# Patient Record
Sex: FEMALE | Race: OTHER
[De-identification: names, ages, dates, MRNs, and addresses within clinical notes are randomized per-mention and may not be internally consistent; named-entity substitution may affect disease eponyms.]

---

## 2017-11-10 ENCOUNTER — HOSPITAL ENCOUNTER (INPATIENT)
Dept: HOSPITAL 72 - EMR | Age: 67
LOS: 5 days | Discharge: ANOTHER HOSPITAL | DRG: 288 | End: 2017-11-15
Payer: MEDICARE

## 2017-11-10 VITALS — SYSTOLIC BLOOD PRESSURE: 134 MMHG | DIASTOLIC BLOOD PRESSURE: 68 MMHG

## 2017-11-10 VITALS — DIASTOLIC BLOOD PRESSURE: 62 MMHG | SYSTOLIC BLOOD PRESSURE: 128 MMHG

## 2017-11-10 VITALS — DIASTOLIC BLOOD PRESSURE: 52 MMHG | SYSTOLIC BLOOD PRESSURE: 105 MMHG

## 2017-11-10 VITALS — BODY MASS INDEX: 23.9 KG/M2 | WEIGHT: 140 LBS | HEIGHT: 64 IN

## 2017-11-10 DIAGNOSIS — I11.0: ICD-10-CM

## 2017-11-10 DIAGNOSIS — G93.40: ICD-10-CM

## 2017-11-10 DIAGNOSIS — I35.1: ICD-10-CM

## 2017-11-10 DIAGNOSIS — I45.81: ICD-10-CM

## 2017-11-10 DIAGNOSIS — E11.9: ICD-10-CM

## 2017-11-10 DIAGNOSIS — I33.0: Primary | ICD-10-CM

## 2017-11-10 DIAGNOSIS — M48.00: ICD-10-CM

## 2017-11-10 DIAGNOSIS — I50.41: ICD-10-CM

## 2017-11-10 DIAGNOSIS — R21: ICD-10-CM

## 2017-11-10 DIAGNOSIS — R00.0: ICD-10-CM

## 2017-11-10 DIAGNOSIS — M17.0: ICD-10-CM

## 2017-11-10 DIAGNOSIS — J45.909: ICD-10-CM

## 2017-11-10 DIAGNOSIS — Z79.82: ICD-10-CM

## 2017-11-10 DIAGNOSIS — G89.29: ICD-10-CM

## 2017-11-10 DIAGNOSIS — M43.10: ICD-10-CM

## 2017-11-10 DIAGNOSIS — F31.9: ICD-10-CM

## 2017-11-10 DIAGNOSIS — D64.9: ICD-10-CM

## 2017-11-10 DIAGNOSIS — E78.00: ICD-10-CM

## 2017-11-10 LAB
ALBUMIN/GLOB SERPL: 0.6 {RATIO} (ref 1–2.7)
ALT SERPL-CCNC: 22 U/L (ref 12–78)
ANION GAP SERPL CALC-SCNC: 9 MMOL/L (ref 5–15)
APTT BLD: 29 SEC (ref 23–33)
AST SERPL-CCNC: 22 U/L (ref 15–37)
BASOPHILS NFR BLD AUTO: 1.6 % (ref 0–2)
CALCIUM SERPL-MCNC: 9.1 MG/DL (ref 8.5–10.1)
CHLORIDE SERPL-SCNC: 105 MMOL/L (ref 98–107)
CK MB SERPL-MCNC: 13.4 NG/ML (ref 0–3.6)
CO2 SERPL-SCNC: 24 MMOL/L (ref 21–32)
CREAT SERPL-MCNC: 0.9 MG/DL (ref 0.55–1.3)
EOSINOPHIL NFR BLD AUTO: 0.1 % (ref 0–3)
ERYTHROCYTE [DISTWIDTH] IN BLOOD BY AUTOMATED COUNT: 14.7 % (ref 11.6–14.8)
GFR SERPLBLD BASED ON 1.73 SQ M-ARVRAT: > 60 ML/MIN (ref 60–?)
GLOBULIN SER-MCNC: 4.9 G/DL
INR PPP: 1.1 (ref 0.9–1.1)
LIPASE SERPL-CCNC: 163 U/L (ref 73–393)
LYMPHOCYTES NFR BLD AUTO: 17.9 % (ref 20–45)
MCH RBC QN AUTO: 28.2 PG (ref 27–31)
MCHC RBC AUTO-ENTMCNC: 30.7 G/DL (ref 32–36)
MCV RBC AUTO: 92 FL (ref 80–99)
MONOCYTES NFR BLD AUTO: 4.7 % (ref 1–10)
NEUTROPHILS NFR BLD AUTO: 75.7 % (ref 45–75)
PLATELET # BLD: 175 K/UL (ref 150–450)
PMV BLD AUTO: 6.4 FL (ref 6.5–10.1)
POTASSIUM SERPL-SCNC: 3.9 MMOL/L (ref 3.5–5.1)
PROT SERPL-MCNC: 7.6 G/DL (ref 6.4–8.2)
PROTHROMBIN TIME: 11.3 SEC (ref 9.3–11.5)
RBC # BLD AUTO: 2.82 M/UL (ref 4.2–5.4)
SODIUM SERPL-SCNC: 138 MMOL/L (ref 136–145)
WBC # BLD AUTO: 3.8 K/UL (ref 4.8–10.8)

## 2017-11-10 PROCEDURE — 94664 DEMO&/EVAL PT USE INHALER: CPT

## 2017-11-10 PROCEDURE — 87081 CULTURE SCREEN ONLY: CPT

## 2017-11-10 PROCEDURE — 85651 RBC SED RATE NONAUTOMATED: CPT

## 2017-11-10 PROCEDURE — 93306 TTE W/DOPPLER COMPLETE: CPT

## 2017-11-10 PROCEDURE — 83036 HEMOGLOBIN GLYCOSYLATED A1C: CPT

## 2017-11-10 PROCEDURE — 83605 ASSAY OF LACTIC ACID: CPT

## 2017-11-10 PROCEDURE — 36569 INSJ PICC 5 YR+ W/O IMAGING: CPT

## 2017-11-10 PROCEDURE — 82746 ASSAY OF FOLIC ACID SERUM: CPT

## 2017-11-10 PROCEDURE — 76937 US GUIDE VASCULAR ACCESS: CPT

## 2017-11-10 PROCEDURE — 82962 GLUCOSE BLOOD TEST: CPT

## 2017-11-10 PROCEDURE — 83690 ASSAY OF LIPASE: CPT

## 2017-11-10 PROCEDURE — 83550 IRON BINDING TEST: CPT

## 2017-11-10 PROCEDURE — 80061 LIPID PANEL: CPT

## 2017-11-10 PROCEDURE — 82550 ASSAY OF CK (CPK): CPT

## 2017-11-10 PROCEDURE — 85007 BL SMEAR W/DIFF WBC COUNT: CPT

## 2017-11-10 PROCEDURE — 93005 ELECTROCARDIOGRAM TRACING: CPT

## 2017-11-10 PROCEDURE — 83880 ASSAY OF NATRIURETIC PEPTIDE: CPT

## 2017-11-10 PROCEDURE — 84484 ASSAY OF TROPONIN QUANT: CPT

## 2017-11-10 PROCEDURE — 83735 ASSAY OF MAGNESIUM: CPT

## 2017-11-10 PROCEDURE — 93312 ECHO TRANSESOPHAGEAL: CPT

## 2017-11-10 PROCEDURE — 83615 LACTATE (LD) (LDH) ENZYME: CPT

## 2017-11-10 PROCEDURE — 86140 C-REACTIVE PROTEIN: CPT

## 2017-11-10 PROCEDURE — 93882 EXTRACRANIAL UNI/LTD STUDY: CPT

## 2017-11-10 PROCEDURE — 87040 BLOOD CULTURE FOR BACTERIA: CPT

## 2017-11-10 PROCEDURE — 94150 VITAL CAPACITY TEST: CPT

## 2017-11-10 PROCEDURE — 85025 COMPLETE CBC W/AUTO DIFF WBC: CPT

## 2017-11-10 PROCEDURE — 94003 VENT MGMT INPAT SUBQ DAY: CPT

## 2017-11-10 PROCEDURE — 99285 EMERGENCY DEPT VISIT HI MDM: CPT

## 2017-11-10 PROCEDURE — 83540 ASSAY OF IRON: CPT

## 2017-11-10 PROCEDURE — 85060 BLOOD SMEAR INTERPRETATION: CPT

## 2017-11-10 PROCEDURE — 80048 BASIC METABOLIC PNL TOTAL CA: CPT

## 2017-11-10 PROCEDURE — 82607 VITAMIN B-12: CPT

## 2017-11-10 PROCEDURE — 71275 CT ANGIOGRAPHY CHEST: CPT

## 2017-11-10 PROCEDURE — 86901 BLOOD TYPING SEROLOGIC RH(D): CPT

## 2017-11-10 PROCEDURE — 86900 BLOOD TYPING SEROLOGIC ABO: CPT

## 2017-11-10 PROCEDURE — 80053 COMPREHEN METABOLIC PANEL: CPT

## 2017-11-10 PROCEDURE — 82553 CREATINE MB FRACTION: CPT

## 2017-11-10 PROCEDURE — 85044 MANUAL RETICULOCYTE COUNT: CPT

## 2017-11-10 PROCEDURE — 71010: CPT

## 2017-11-10 PROCEDURE — 80202 ASSAY OF VANCOMYCIN: CPT

## 2017-11-10 PROCEDURE — 82270 OCCULT BLOOD FECES: CPT

## 2017-11-10 PROCEDURE — 86850 RBC ANTIBODY SCREEN: CPT

## 2017-11-10 PROCEDURE — 85610 PROTHROMBIN TIME: CPT

## 2017-11-10 PROCEDURE — 85730 THROMBOPLASTIN TIME PARTIAL: CPT

## 2017-11-10 PROCEDURE — 80170 ASSAY OF GENTAMICIN: CPT

## 2017-11-10 PROCEDURE — 36415 COLL VENOUS BLD VENIPUNCTURE: CPT

## 2017-11-10 RX ADMIN — DIPHENHYDRAMINE HYDROCHLORIDE PRN MG: 50 INJECTION INTRAMUSCULAR; INTRAVENOUS at 22:28

## 2017-11-10 RX ADMIN — CEPHALEXIN SCH MG: 500 TABLET ORAL at 23:31

## 2017-11-10 RX ADMIN — ATORVASTATIN CALCIUM SCH MG: 20 TABLET, FILM COATED ORAL at 22:27

## 2017-11-10 NOTE — DIAGNOSTIC IMAGING REPORT
Indication: Dyspnea



Comparison:  None



A single view chest radiograph was obtained.



Findings:



Osteopenic. Heart is enlarged. There is a PICC line on the right with the tip in the

SVC. Lungs are essentially clear. Vascularity is mildly prominent.



Impression:



No acute disease

## 2017-11-10 NOTE — CARDIOLOGY PROGRESS NOTE
Assessment/Plan


Assessment/Plan


The patient is seen and examined, full consult note is dictated.





Objective





Last 24 Hour Vital Signs








  Date Time  Temp Pulse Resp B/P (MAP) Pulse Ox O2 Delivery O2 Flow Rate FiO2


 


11/10/17 14:52 98.8 102 20 112/50 97 Room Air  

















Intake and Output  


 


 11/10/17 11/11/17





 19:00 07:00


 


Intake Total 0 ml 


 


Balance 0 ml 


 


  


 


Intake Oral 0 ml 











Laboratory Tests








Test


  11/10/17


15:15


 


White Blood Count


  3.8 K/UL


(4.8-10.8)  L


 


Red Blood Count


  2.82 M/UL


(4.20-5.40)  L


 


Hemoglobin


  8.0 G/DL


(12.0-16.0)  L


 


Hematocrit


  25.9 %


(37.0-47.0)  L


 


Mean Corpuscular Volume 92 FL (80-99)  


 


Mean Corpuscular Hemoglobin


  28.2 PG


(27.0-31.0)


 


Mean Corpuscular Hemoglobin


Concent 30.7 G/DL


(32.0-36.0)  L


 


Red Cell Distribution Width


  14.7 %


(11.6-14.8)


 


Platelet Count


  175 K/UL


(150-450)


 


Mean Platelet Volume


  6.4 FL


(6.5-10.1)  L


 


Neutrophils (%) (Auto)


  75.7 %


(45.0-75.0)  H


 


Lymphocytes (%) (Auto)


  17.9 %


(20.0-45.0)  L


 


Monocytes (%) (Auto)


  4.7 %


(1.0-10.0)


 


Eosinophils (%) (Auto)


  0.1 %


(0.0-3.0)


 


Basophils (%) (Auto)


  1.6 %


(0.0-2.0)


 


Prothrombin Time


  11.3 SEC


(9.30-11.50)


 


Prothromb Time International


Ratio 1.1 (0.9-1.1)  


 


 


Activated Partial


Thromboplast Time 29 SEC (23-33)


 


 


Sodium Level


  138 MMOL/L


(136-145)


 


Potassium Level


  3.9 MMOL/L


(3.5-5.1)


 


Chloride Level


  105 MMOL/L


()


 


Carbon Dioxide Level


  24 MMOL/L


(21-32)


 


Anion Gap


  9 mmol/L


(5-15)


 


Blood Urea Nitrogen


  19 mg/dL


(7-18)  H


 


Creatinine


  0.9 MG/DL


(0.55-1.30)


 


Estimat Glomerular Filtration


Rate > 60 mL/min


(>60)


 


Glucose Level


  147 MG/DL


()  H


 


Lactic Acid Level


  1.30 mmol/L


(0.66-2.22)


 


Calcium Level


  9.1 MG/DL


(8.5-10.1)


 


Total Bilirubin


  0.4 MG/DL


(0.2-1.0)


 


Aspartate Amino Transf


(AST/SGOT) 22 U/L (15-37)


 


 


Alanine Aminotransferase


(ALT/SGPT) 22 U/L (12-78)


 


 


Alkaline Phosphatase


  92 U/L


()


 


Total Creatine Kinase


  20 U/L


()  L


 


Creatine Kinase MB


  13.4 NG/ML


(0.0-3.6)  H


 


Creatine Kinase MB Relative


Index 67.0  


 


 


Troponin I


  0.128 ng/mL


(0.000-0.056)


 


Total Protein


  7.6 G/DL


(6.4-8.2)


 


Albumin


  2.7 G/DL


(3.4-5.0)  L


 


Globulin 4.9 g/dL  


 


Albumin/Globulin Ratio


  0.6 (1.0-2.7)


L


 


Lipase


  163 U/L


()

















SAMSON LAWTON Nov 10, 2017 18:13

## 2017-11-10 NOTE — EMERGENCY ROOM REPORT
History of Present Illness


General


Chief Complaint:  Syncope


Source:  Patient





Present Illness


HPI


Patient is a 67-year-old female brought in by EMS after being sent in by PMD 

the patient was noted to have recent syncopal episode.  The patient had been 

undergoing IV antibiotic treatment for infective endocarditis.  Patient prior 

history of bipolar disorder.  Patient poorly had a syncopal episode today with 

unknown length of time.  The patient denies any current symptoms.  She denies 

prior syncopal episodes.  Patient stated that she had been having a rash to 

baby oil.  Patient stated that the episode occurred while she was having a 

massage


Allergies:  


Coded Allergies:  


     No Known Allergies (Unverified , 11/10/17)





Patient History


Past Medical History:  see triage record


Past Surgical History:  unable to obtain


Reviewed Nursing Documentation:  PMH: Agreed, PSxH: Agreed





Nursing Documentation-PM


Past Medical History:  No History, Except For


Hx Cardiac Problems:  Yes - Endocarditis, hyperlipidemia


Hx Hypertension:  Yes - Osteoarthritis, spondylolisthesis, spinal stenosis


Hx Asthma:  Yes


Hx Diabetes:  Yes


History Of Psychiatric Problem:  Yes - Bipolar disorder, major depression





Review of Systems


All Other Systems:  negative except mentioned in HPI





Physical Exam





Vital Signs








  Date Time  Temp Pulse Resp B/P (MAP) Pulse Ox O2 Delivery O2 Flow Rate FiO2


 


11/10/17 14:52 98.8 102 20 112/50 97 Room Air  








Sp02 EP Interpretation:  reviewed, normal


General Appearance:  normal inspection, well appearing, no apparent distress, 

alert, GCS 15


Head:  atraumatic


ENT:  normal ENT inspection, hearing grossly normal, normal voice


Neck:  normal inspection, full range of motion, supple, no bony tend


Respiratory:  normal inspection, lungs clear, normal breath sounds, no 

respiratory distress, no retraction, no wheezing


Cardiovascular #1:  regular rate, rhythm, no edema


Gastrointestinal:  normal inspection, normal bowel sounds, non tender, soft, no 

guarding, no hernia


Genitourinary:  no CVA tenderness


Musculoskeletal:  normal inspection, back normal, normal range of motion


Neurologic:  normal inspection, alert, oriented x3, responsive, CNs III-XII nml 

as tested, speech normal


Psychiatric:  normal inspection, judgement/insight normal, mood/affect normal


Skin:  normal inspection, other - generalized papular rash





Medical Decision Making


Diagnostic Impression:  


 Primary Impression:  


 Syncope


 Additional Impressions:  


 QT prolongation


 Anemia


 Endocarditis


ER Course


patient presented for syncope.Differential diagnosis for syncope included 

arrhythmia, dehydration, acute coronary syndrome, severe anemia, pulmonary 

embolus.Because of complexity of patient's case laboratory testing and imaging 

studies were ordered.





The patient noted have prior history of infective endocarditis.  Patient was on 

cardiac monitor.  The patient was started on IV fluids.  Dr. Moon was 

contacted for inpatient management due to patient's recent syncopal episode as 

well as history of endocarditis.  Patient noted be somewhat anemic.  The 

patient was typed and screened for blood.





Labs








Test


  11/10/17


15:15


 


White Blood Count


  3.8 K/UL


(4.8-10.8)


 


Red Blood Count


  2.82 M/UL


(4.20-5.40)


 


Hemoglobin


  8.0 G/DL


(12.0-16.0)


 


Hematocrit


  25.9 %


(37.0-47.0)


 


Mean Corpuscular Volume 92 FL (80-99) 


 


Mean Corpuscular Hemoglobin


  28.2 PG


(27.0-31.0)


 


Mean Corpuscular Hemoglobin


Concent 30.7 G/DL


(32.0-36.0)


 


Red Cell Distribution Width


  14.7 %


(11.6-14.8)


 


Platelet Count


  175 K/UL


(150-450)


 


Mean Platelet Volume


  6.4 FL


(6.5-10.1)


 


Neutrophils (%) (Auto)


  75.7 %


(45.0-75.0)


 


Lymphocytes (%) (Auto)


  17.9 %


(20.0-45.0)


 


Monocytes (%) (Auto)


  4.7 %


(1.0-10.0)


 


Eosinophils (%) (Auto)


  0.1 %


(0.0-3.0)


 


Basophils (%) (Auto)


  1.6 %


(0.0-2.0)


 


Prothrombin Time


  11.3 SEC


(9.30-11.50)


 


Prothromb Time International


Ratio 1.1 (0.9-1.1) 


 


 


Activated Partial


Thromboplast Time 29 SEC (23-33) 


 


 


Sodium Level


  138 MMOL/L


(136-145)


 


Potassium Level


  3.9 MMOL/L


(3.5-5.1)


 


Chloride Level


  105 MMOL/L


()


 


Carbon Dioxide Level


  24 MMOL/L


(21-32)


 


Anion Gap


  9 mmol/L


(5-15)


 


Blood Urea Nitrogen


  19 mg/dL


(7-18)


 


Creatinine


  0.9 MG/DL


(0.55-1.30)


 


Estimat Glomerular Filtration


Rate > 60 mL/min


(>60)


 


Glucose Level


  147 MG/DL


()


 


Lactic Acid Level


  1.30 mmol/L


(0.66-2.22)


 


Calcium Level


  9.1 MG/DL


(8.5-10.1)


 


Total Bilirubin


  0.4 MG/DL


(0.2-1.0)


 


Aspartate Amino Transf


(AST/SGOT) 22 U/L (15-37) 


 


 


Alanine Aminotransferase


(ALT/SGPT) 22 U/L (12-78) 


 


 


Alkaline Phosphatase


  92 U/L


()


 


Total Creatine Kinase


  20 U/L


()


 


Creatine Kinase MB


  13.4 NG/ML


(0.0-3.6)


 


Creatine Kinase MB Relative


Index 67.0 


 


 


Troponin I


  0.128 ng/mL


(0.000-0.056)


 


Total Protein


  7.6 G/DL


(6.4-8.2)


 


Albumin


  2.7 G/DL


(3.4-5.0)


 


Globulin 4.9 g/dL 


 


Albumin/Globulin Ratio 0.6 (1.0-2.7) 


 


Lipase


  163 U/L


()








EKG Diagnostic Results


Rate:  tachycardiac


Rhythm:  NSR - 104


ST Segments:  other - qt prolongation





Rhythm Strip Diag. Results


EP Interpretation:  yes


Rhythm:  no PVC's, no ectopy, other - sinus tachycardia 105





Last Vital Signs








  Date Time  Temp Pulse Resp B/P (MAP) Pulse Ox O2 Delivery O2 Flow Rate FiO2


 


11/10/17 14:52 98.8 102 20 112/50 97 Room Air  








Status:  unchanged


Disposition:  ADMITTED AS INPATIENT


Condition:  Serious











Prakash Grande Nov 10, 2017 15:02

## 2017-11-11 VITALS — DIASTOLIC BLOOD PRESSURE: 54 MMHG | SYSTOLIC BLOOD PRESSURE: 114 MMHG

## 2017-11-11 VITALS — DIASTOLIC BLOOD PRESSURE: 59 MMHG | SYSTOLIC BLOOD PRESSURE: 116 MMHG

## 2017-11-11 VITALS — DIASTOLIC BLOOD PRESSURE: 51 MMHG | SYSTOLIC BLOOD PRESSURE: 126 MMHG

## 2017-11-11 VITALS — SYSTOLIC BLOOD PRESSURE: 127 MMHG | DIASTOLIC BLOOD PRESSURE: 61 MMHG

## 2017-11-11 VITALS — SYSTOLIC BLOOD PRESSURE: 115 MMHG | DIASTOLIC BLOOD PRESSURE: 52 MMHG

## 2017-11-11 VITALS — DIASTOLIC BLOOD PRESSURE: 65 MMHG | SYSTOLIC BLOOD PRESSURE: 128 MMHG

## 2017-11-11 LAB
ALBUMIN/GLOB SERPL: 0.5 {RATIO} (ref 1–2.7)
ALT SERPL-CCNC: 21 U/L (ref 12–78)
ANION GAP SERPL CALC-SCNC: 10 MMOL/L (ref 5–15)
AST SERPL-CCNC: 19 U/L (ref 15–37)
BASOPHILS NFR BLD AUTO: 1.2 % (ref 0–2)
CALCIUM SERPL-MCNC: 9.4 MG/DL (ref 8.5–10.1)
CHLORIDE SERPL-SCNC: 107 MMOL/L (ref 98–107)
CHOLEST SERPL-MCNC: 89 MG/DL (ref ?–200)
CHOLEST/HDLC SERPL: 2.6 {RATIO} (ref 3.3–4.4)
CO2 SERPL-SCNC: 22 MMOL/L (ref 21–32)
CREAT SERPL-MCNC: 0.7 MG/DL (ref 0.55–1.3)
EOSINOPHIL NFR BLD AUTO: 0.1 % (ref 0–3)
ERYTHROCYTE [DISTWIDTH] IN BLOOD BY AUTOMATED COUNT: 14.8 % (ref 11.6–14.8)
GFR SERPLBLD BASED ON 1.73 SQ M-ARVRAT: > 60 ML/MIN (ref 60–?)
GLOBULIN SER-MCNC: 5 G/DL
HBA1C MFR BLD: 7.1 % (ref 4.3–6)
LYMPHOCYTES NFR BLD AUTO: 15.1 % (ref 20–45)
MCH RBC QN AUTO: 29.3 PG (ref 27–31)
MCHC RBC AUTO-ENTMCNC: 32.7 G/DL (ref 32–36)
MCV RBC AUTO: 90 FL (ref 80–99)
MONOCYTES NFR BLD AUTO: 5.5 % (ref 1–10)
NEUTROPHILS NFR BLD AUTO: 78.2 % (ref 45–75)
PLATELET # BLD: 216 K/UL (ref 150–450)
PMV BLD AUTO: 6.7 FL (ref 6.5–10.1)
POTASSIUM SERPL-SCNC: 3.8 MMOL/L (ref 3.5–5.1)
PROT SERPL-MCNC: 7.7 G/DL (ref 6.4–8.2)
RBC # BLD AUTO: 3.06 M/UL (ref 4.2–5.4)
SODIUM SERPL-SCNC: 139 MMOL/L (ref 136–145)
WBC # BLD AUTO: 4.1 K/UL (ref 4.8–10.8)

## 2017-11-11 RX ADMIN — CHLORHEXIDINE GLUCONATE SCH APPLIC: 213 SOLUTION TOPICAL at 21:12

## 2017-11-11 RX ADMIN — DIPHENHYDRAMINE HYDROCHLORIDE PRN MG: 50 INJECTION INTRAMUSCULAR; INTRAVENOUS at 09:43

## 2017-11-11 RX ADMIN — ASPIRIN SCH MG: 81 TABLET, DELAYED RELEASE ORAL at 08:20

## 2017-11-11 RX ADMIN — ATORVASTATIN CALCIUM SCH MG: 20 TABLET, FILM COATED ORAL at 21:12

## 2017-11-11 RX ADMIN — ENOXAPARIN SODIUM SCH MG: 40 INJECTION SUBCUTANEOUS at 13:12

## 2017-11-11 RX ADMIN — DIPHENHYDRAMINE HYDROCHLORIDE PRN MG: 50 INJECTION INTRAMUSCULAR; INTRAVENOUS at 16:33

## 2017-11-11 RX ADMIN — SODIUM CHLORIDE SCH MLS/HR: 0.9 INJECTION INTRAVENOUS at 16:34

## 2017-11-11 RX ADMIN — CEPHALEXIN SCH MG: 500 TABLET ORAL at 06:17

## 2017-11-11 RX ADMIN — DOCUSATE SODIUM SCH MG: 100 CAPSULE, LIQUID FILLED ORAL at 08:20

## 2017-11-11 RX ADMIN — CEPHALEXIN SCH MG: 500 TABLET ORAL at 12:00

## 2017-11-11 RX ADMIN — DOCUSATE SODIUM SCH MG: 100 CAPSULE, LIQUID FILLED ORAL at 18:05

## 2017-11-11 RX ADMIN — CLOBETASOL PROPIONATE SCH APPLIC: 0.5 OINTMENT TOPICAL at 18:05

## 2017-11-11 NOTE — CONSULTATION
History of Present Illness


General


Date patient seen:  Nov 11, 2017


Time patient seen:  10:34


Chief Complaint:  Syncope





Present Illness


HPI


66 y/o F with hx of HLD, OA, spinal stenosis, endocarditis, DM2, Bipolar 

disorder, spondylolisthesis, asthma  presents to ED on 11/10 after syncopal 

episode while patient receiving massage at rehab center. No LOC but did had LOC 

on arribale to hospital. In ED noted to be tachycardic to 102 despite 3 L of 

fluid. EKG with prolonged qtc 486.





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 

[facility] and was on treatment with IV Vanco, Doxy and Gentamycin; was 

discharged to [facility].





Denies CP, SOB, DE, dizziness/lightheadedness upon admission.





patient afebrile, no leukocytosis. echo pending 








REviewed records in the chart- patient presented to [facility] on 10/1 for 

low back pain and elevated trops. 2d echo showed possible AV vegatation. KITTY 

was going to be done but patient left AMA on 10/3. Eventually she got KITTY which 

showed echodense material on AV and also a mobile stucture pulling on LV- 

possible vegetations vs marantic non-infectious vegetations or fibroelastoma 

was considered. Started on IV Ceftriaxone and Vancomycin  and discharged to 

[facility]





Per physicians order at rehab: Doxycycline was planned up to 2/5/18 and 

Gentamycin 60mg IV bid until 11/19/17, IV Vancomycin until 12/12/17


Allergies:  


Uncoded Allergies:  


     Baby oil (Allergy, Severe, Itching, 11/10/17)





Medication History


Scheduled


Aspirin* (Aspir 81*), 81 MG ORAL DAILY, (Reported)


Atorvastatin Calcium* (Atorvastatin Calcium*), 40 MG ORAL BEDTIME, (Reported)


Cephalexin* (Cephalexin*), 500 MG ORAL EVERY 6 HOURS, (Reported)


Docusate Sodium* (Colace*), 100 MG ORAL TWICE A DAY, (Reported)


Doxycycline Hyclate* (Vibramycin*), 100 MG ORAL EVERY 12 HOURS, (Reported)


Enoxaparin* (Lovenox*), 40 MG SUBQ BID, (Reported)


Fluoxetine Hcl* (Fluoxetine Hcl*), 20 MG ORAL DAILY, (Reported)


Gentamicin In Nacl, Iso-Osm (Gentamicin 70 Mg/Ns 50 Ml Pb), 60 MG IV BID, (

Reported)


Metformin Hcl* (Metformin Hcl*), 850 MG ORAL BID, (Reported)


Olanzapine* (Zyprexa*), 5 MG ORAL BEDTIME, (Reported)


Vancomycin HCl/D5w (Vancomycin 1.5 Gram/250 ml-D5w), 1.5 GM IV BID, (Reported)


[Lispro], Unknown Dose SUBQ BEFORE MEALS, (Reported)





Scheduled PRN


Acetaminophen* (Acetaminophen 325MG Tablet*), 650 MG ORAL Q4H PRN for For Pain, 

(Reported)


Albuterol Sulfate* (Albuterol Sulfate Hhn*), 3 ML INH Q4H PRN for Shortness of 

Breath, (Reported)


Hydrocodone Bit/Acetaminophen 5-325* (Norco 5-325*), 1 TAB ORAL Q6H PRN for For 

Pain, (Reported)





Patient History


Healthcare decision maker


N


Resuscitation status


Full Code


Advanced Directive on File





Patient History Narrative


Pmx: as above





SH:  Denies any tobacco, alcohol, or illicit drug use.





Fhx non contributory





Review of Systems


ROS Narrative


as per HPI, otherwise negative





Physical Exam


Cardiovascular/Chest:  regularly irregular


Physical Exam Narrative


GENERAL:  The patient is a very pleasant 67-year-old female, in no apparent


respiratory distress, alert and oriented x4.


HEENT:  Atraumatic and normocephalic.  ENT, pupils are equal, round, and


reactive to light and accommodation.  Extraocular muscles are intact.


NECK:  JVP less than 5 cm.  No carotid bruits.  Carotid upstrokes 2+


bilaterally.


CARDIOVASCULAR:  Normal S1 and S2.  Regular rate and rhythm.  No murmurs,


gallops, or rubs.  PMI is at fourth intercostal space in the midclavicular


line.


LUNGS:  Clear to auscultation bilaterally.


ABDOMEN:  Soft, nontender, and nondistended.  No hepatosplenomegaly.


Positive bowel sounds.


EXTREMITIES:  No evidence of edema, clubbing, or cyanosis.





Last 24 Hour Vital Signs








  Date Time  Temp Pulse Resp B/P (MAP) Pulse Ox O2 Delivery O2 Flow Rate FiO2


 


11/11/17 08:00 97.8 115 21 127/61 97 Room Air  


 


11/11/17 04:00  119      


 


11/11/17 04:00 97.5 100 23 128/65 89 Room Air  


 


11/11/17 00:00 98.1 106 23 116/59 85 Room Air  


 


11/11/17 00:00  110      


 


11/10/17 20:00 97.9 100 23 105/52 94 Room Air  


 


11/10/17 20:00  108      


 


11/10/17 18:35 98.4 78 20 134/68 99 Room Air  


 


11/10/17 16:30 98.4 82 20 128/62 99 Room Air  


 


11/10/17 14:52 98.8 102 20 112/50 97 Room Air  











Laboratory Tests








Test


  11/10/17


15:15 11/10/17


18:45 11/11/17


05:15


 


White Blood Count


  3.8 K/UL


(4.8-10.8)  L 


  4.1 K/UL


(4.8-10.8)  L


 


Red Blood Count


  2.82 M/UL


(4.20-5.40)  L 


  3.06 M/UL


(4.20-5.40)  L


 


Hemoglobin


  8.0 G/DL


(12.0-16.0)  L 


  9.0 G/DL


(12.0-16.0)  L


 


Hematocrit


  25.9 %


(37.0-47.0)  L 


  27.5 %


(37.0-47.0)  L


 


Mean Corpuscular Volume 92 FL (80-99)    90 FL (80-99)  


 


Mean Corpuscular Hemoglobin


  28.2 PG


(27.0-31.0) 


  29.3 PG


(27.0-31.0)


 


Mean Corpuscular Hemoglobin


Concent 30.7 G/DL


(32.0-36.0)  L 


  32.7 G/DL


(32.0-36.0)


 


Red Cell Distribution Width


  14.7 %


(11.6-14.8) 


  14.8 %


(11.6-14.8)


 


Platelet Count


  175 K/UL


(150-450) 


  216 K/UL


(150-450)


 


Mean Platelet Volume


  6.4 FL


(6.5-10.1)  L 


  6.7 FL


(6.5-10.1)


 


Neutrophils (%) (Auto)


  75.7 %


(45.0-75.0)  H 


  78.2 %


(45.0-75.0)  H


 


Lymphocytes (%) (Auto)


  17.9 %


(20.0-45.0)  L 


  15.1 %


(20.0-45.0)  L


 


Monocytes (%) (Auto)


  4.7 %


(1.0-10.0) 


  5.5 %


(1.0-10.0)


 


Eosinophils (%) (Auto)


  0.1 %


(0.0-3.0) 


  0.1 %


(0.0-3.0)


 


Basophils (%) (Auto)


  1.6 %


(0.0-2.0) 


  1.2 %


(0.0-2.0)


 


Prothrombin Time


  11.3 SEC


(9.30-11.50) 


  


 


 


Prothromb Time International


Ratio 1.1 (0.9-1.1)  


  


  


 


 


Activated Partial


Thromboplast Time 29 SEC (23-33)


  


  


 


 


Sodium Level


  138 MMOL/L


(136-145) 


  139 MMOL/L


(136-145)


 


Potassium Level


  3.9 MMOL/L


(3.5-5.1) 


  3.8 MMOL/L


(3.5-5.1)


 


Chloride Level


  105 MMOL/L


() 


  107 MMOL/L


()


 


Carbon Dioxide Level


  24 MMOL/L


(21-32) 


  22 MMOL/L


(21-32)


 


Anion Gap


  9 mmol/L


(5-15) 


  10 mmol/L


(5-15)


 


Blood Urea Nitrogen


  19 mg/dL


(7-18)  H 


  15 mg/dL


(7-18)


 


Creatinine


  0.9 MG/DL


(0.55-1.30) 


  0.7 MG/DL


(0.55-1.30)


 


Estimat Glomerular Filtration


Rate > 60 mL/min


(>60) 


  > 60 mL/min


(>60)


 


Glucose Level


  147 MG/DL


()  H 


  160 MG/DL


()  H


 


Lactic Acid Level


  1.30 mmol/L


(0.66-2.22) 


  


 


 


Calcium Level


  9.1 MG/DL


(8.5-10.1) 


  9.4 MG/DL


(8.5-10.1)


 


Total Bilirubin


  0.4 MG/DL


(0.2-1.0) 


  0.4 MG/DL


(0.2-1.0)


 


Aspartate Amino Transf


(AST/SGOT) 22 U/L (15-37)


  


  19 U/L (15-37)


 


 


Alanine Aminotransferase


(ALT/SGPT) 22 U/L (12-78)


  


  21 U/L (12-78)


 


 


Alkaline Phosphatase


  92 U/L


() 


  95 U/L


()


 


Total Creatine Kinase


  20 U/L


()  L 


  


 


 


Creatine Kinase MB


  13.4 NG/ML


(0.0-3.6)  H 


  


 


 


Creatine Kinase MB Relative


Index 67.0  


  


  


 


 


Troponin I


  0.128 ng/mL


(0.000-0.056) 


  


 


 


Total Protein


  7.6 G/DL


(6.4-8.2) 


  7.7 G/DL


(6.4-8.2)


 


Albumin


  2.7 G/DL


(3.4-5.0)  L 


  2.7 G/DL


(3.4-5.0)  L


 


Globulin 4.9 g/dL    5.0 g/dL  


 


Albumin/Globulin Ratio


  0.6 (1.0-2.7)


L 


  0.5 (1.0-2.7)


L


 


Lipase


  163 U/L


() 


  


 


 


Magnesium Level


  


  1.3 MG/DL


(1.8-2.4)  L 


 


 


Hemoglobin A1c


  


  


  7.1 %


(4.3-6.0)  H


 


Triglycerides Level


  


  


  60 MG/DL


(0-200)


 


Cholesterol Level


  


  


  89 MG/DL (<


200)


 


LDL Cholesterol


  


  


  46 mg/dL


(<100)


 


HDL Cholesterol


  


  


  34 MG/DL


(40-60)  L


 


Cholesterol/HDL Ratio


  


  


  2.6 (3.3-4.4)


L








Height (Feet):  5


Height (Inches):  4.00


Weight (Pounds):  140


Medications





Current Medications








 Medications


  (Trade)  Dose


 Ordered  Sig/Lakshmi


 Route


 PRN Reason  Start Time


 Stop Time Status Last Admin


Dose Admin


 


 Acetaminophen


  (Tylenol)  650 mg  Q4H  PRN


 ORAL


 For Pain  11/10/17 21:00


 12/10/17 20:59   


 


 


 Acetaminophen/


 Hydrocodone Bitart


  (Norco 5/325)  1 tab  Q6H  PRN


 ORAL


 For Moderate to Severe Pain  11/10/17 21:00


 11/17/17 20:59   


 


 


 Albuterol Sulfate


  (Proventil)  2.5 mg  Q4H  PRN


 HHN


 Shortness of Breath  11/10/17 21:00


 11/15/17 20:59   


 


 


 Aspirin


  (Ecotrin)  81 mg  DAILY


 ORAL


   11/11/17 09:00


 12/11/17 08:59  11/11/17 08:20


 


 


 Atorvastatin


 Calcium


  (Lipitor)  40 mg  BEDTIME


 ORAL


   11/10/17 22:30


 12/10/17 22:29  11/10/17 22:27


 


 


 Cephalexin


  (Keflex)  500 mg  EVERY 6  HOURS


 ORAL


   11/11/17 00:00


 11/18/17 00:00  11/11/17 06:17


 


 


 Diphenhydramine


 HCl


  (Benadryl)  25 mg  Q6H  PRN


 IVP


 Itching  11/10/17 22:00


 12/10/17 21:59  11/10/17 22:28


 


 


 Docusate Sodium


  (Colace)  100 mg  TWICE A  DAY


 ORAL


   11/11/17 09:00


 12/11/17 08:59  11/11/17 08:20


 


 


 Doxycycline


 Monohydrate


  (Vibramycin)  100 mg  EVERY 12  HOURS


 ORAL


   11/10/17 22:30


 11/17/17 22:29  11/11/17 08:21


 


 


 Enoxaparin Sodium


  (Lovenox)  40 mg  BID


 SUBQ


   11/11/17 09:00


 12/11/17 08:59 UNV  


 


 


 Fluoxetine HCl


  (PROzac)  20 mg  DAILY


 ORAL


   11/11/17 09:00


 12/11/17 08:59  11/11/17 08:21


 


 


 Metformin HCl


  (Glucophage)  850 mg  BID


 ORAL


   11/11/17 09:00


 12/11/17 08:59   


 


 


 Olanzapine


  (ZyPREXA)  5 mg  BEDTIME


 ORAL


   11/10/17 22:30


 12/10/17 22:29  11/10/17 22:27


 











Assessment/Plan


Assessment/Plan


Abx:


Doxy (started PTA)


Keflex 11/11-





Assesment:


-Syncopal episode- r/o anatomic obstruction due to vegetations vs conduction 

abnormalities vs other





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





-Afebrile, no leukocytosis





HLD, OA, spinal stenosis, endocarditis, DM2, Bipolar disorder, spondylolisthesis

, asthma  





Plan:


-Will continue her already established treatment for endocarditis with  

Doxycycline (untill 2/5/18) ,Gentamycin 60mg IV bid (untill 11/19/11), IV 

Vancomycin (until 12/12/17)


  -monitor platelets, Cr


  -typically bartonella endocarditis consist of Doxy for 6 weeks and Genta for 

2 weeks; however patient has been managed by another ID physician as an 

outpatient and I dont currently have all her records at hand. Will continue abx 

tx as per her outpt ID physician





-d/c keflex


-f/u 2d echo; may need KITTY


-2 sets pf Bcx


-bartonella serologies


-ESR/CRP am (for baseine)


-f/u cx


-Monitor CBC/BMP, temperatures





Thank you for this consultation. Will continue to follow along with you.





Discussed with Jerrica Tanner M.D. Nov 11, 2017 09:33

## 2017-11-11 NOTE — HISTORY AND PHYSICAL REPORT
DATE OF ADMISSION:  11/10/2017



CHIEF COMPLAINT:  The patient is a 67-year-old white female, presents with

a chief complaint of "I passed out."



HISTORY OF PRESENT ILLNESS:  The patient is a resident of [facility].  According

to staff at [facility], the patient was undergoing

physical therapy.  The patient had a heating pad on her back.  The patient

began to feel dizzy.  The patient put her head down.  The patient states

she is aware of everything that was going on during this episode.  The

patient denies loss of consciousness.  EMS was called.  The patient was

transported to [facility] for syncopal episode.



It began in October.  The patient apparently was diagnosed with

endocarditis in [place].  The patient was started on intravenous

vancomycin and gentamicin.  The patient has a PICC line in place.  The

patient was transferred to [facility], because the

patient left the skilled nursing facility in [place].



According to staff at [facility], the patient

began to experience dizziness yesterday, 11/10/2017 during physical

therapy. The patient had a heating pad on her back.  The patient put her

head down on the table.  The patient states she did not lose

consciousness.  The patient states she was only dizzy.  EMS was called.

The patient was transported to [facility] for near syncopal

episode.



REVIEW OF SYSTEMS:  CONSTITUTIONAL:  The patient denies weight loss or

weight gain.  The patient denies fevers or chills.  HEENT:  The patient

denies ear or throat pain.  The patient denies headache.

CARDIOVASCULAR:  The patient denies palpitation or chest pain.    CHEST:

The patient denies wheeze or shortness of breath.  ABDOMEN:  The patient

denies nausea, vomiting, diarrhea, or constipation.  GENITOURINARY:  The

patient denies dysuria or increased frequency of urination.

NEUROMUSCULAR:  The patient complains of near syncopal episode as above.

The patient denies seizures or generalized weakness.    INTEGUMENT:  The

patient complains of a rash over the trunk.



PAST MEDICAL HISTORY:  Significant for:



1. Type 2 diabetes.

2. Hypertension.

3. Hypercholesterolemia.

4. Asthma.

5. Chronic low back pain.

6. Osteoarthritis of bilateral knees.



PAST SURGICAL HISTORY:  The patient denies.



CURRENT MEDICATIONS:  From [facility],



1. Aspirin 81 mg one tablet p.o. daily.

2. Atorvastatin 40 mg one tablet p.o. daily.

3. Doxycycline 100 mg one tablet p.o. twice daily.

4. Fluoxetine 20 mg one tablet p.o. daily.

5. Gentamicin 60 mg IV twice daily.

6. Zyprexa 5 mg one tablet p.o. at bedtime.

7. Vancomycin 1.5 g IV q.12 h.

8. Metformin 850 mg one tablet p.o. twice daily.

9. Keflex 500 mg one tablet p.o. four times daily.

10. Colace 100 mg one tablet p.o. twice daily.

11. Lovenox 40 mg one tablet subcutaneously daily.



ALLERGIES:  No known drug allergies.



SOCIAL HISTORY:  The patient is a .  The patient admits to tobacco use

of 1 cigarette daily.  The patient denies alcohol use.



PHYSICAL EXAMINATION:

VITAL SIGNS:  Temperature 98.4 respirations 20, pulse 78, and blood

pressure 134/68.

GENERAL:  The patient is a well-developed and well-nourished 

female, in no apparent distress.

HEENT:  Eyes, pupils are equal and responsive to light and accommodation.

Extraocular movements are intact.

NECK:  Supple without lymphadenopathy.

CHEST:  Lungs are clear to auscultation bilaterally without wheezes or

rales.

CARDIOVASCULAR:  Regular rhythm and rate.  S1 and S2 are normal without

murmurs, rubs, or gallops.

ABDOMEN:  Soft, nontender, nondistended with positive bowel sounds.  No

evidence of hepatosplenomegaly.  Currently, no rebound or guarding

noted.

EXTREMITIES:  Negative for clubbing, cyanosis, or edema.

NEUROLOGIC:  Cranial nerves II to XII are grossly intact without focal

deficits.  Motor strength is 5/5 bilaterally.  DTR reflexes are 2+

plantar.



LABORATORY STUDIES:  WBC is 3.8, hemoglobin 8.3, hematocrit 25.9, and

platelets 175,000.  Sodium 138, potassium 3.9, chloride 105, CO2 24, BUN

9, creatinine 0.9, and glucose 147.  Troponin elevated at 0.128.



ASSESSMENT:  This is a 67-year-old  female.



1. Endocarditis.

2. Syncopal episode.

3. Rash.

4. Diabetes type 2.

5. Hypertension.

6. Hypercholesterolemia.

7. Asthma.

8. Osteoarthritis of the bilateral knees.

9. Chronic low back pain.



TREATMENT:

1. Endocarditis. Infectious Disease consultation will be obtained with

Dr. Zhang.  Continue gentamicin and vancomycin as above.  We will follow

recommendations concerning Keflex and doxycycline per Infectious Disease.

The patient has a PICC line in place.  PICC line age is unknown.  Plan

will be replaced.

2. Syncopal episode.  A Cardiology consultation has been obtained with

Dr. Chivo Crandall.  Carotid Duplex and Dopplers are pending.  We will

follow recommendation of Dr. Crandall.

3. Diabetes type 2.  Continue metformin as above.  NovoLog sliding scale

has been instituted.

4. Hypertension.  The patient is currently normotensive.  Continue

clonidine 0.1 mg p.r.n.

5. Hypercholesterolemia.  Continue atorvastatin as above.

6. Asthma.  Continue albuterol metered dose inhaler two puffs p.o.

q.i.d. p.r.n.

7. Osteoarthritis of the knees.

8. Chronic low back pain.









  ______________________________________________

  Braden Townsend M.D.





DR:  LM/as

D:  11/11/2017 13:23

T:  11/11/2017 21:15

JOB#:  [id number]

CC:

## 2017-11-11 NOTE — HISTORY & PHYSICAL
History and Physical


History & Physicial


Dictated for Int Med-Dr oMon no. [id number].











PEBBLES HORTON Nov 11, 2017 13:24

## 2017-11-11 NOTE — CONSULTATION
DATE OF CONSULTATION:  11/10/2017



CARDIOLOGY CONSULTATION



CONSULTING PHYSICIAN:  Chivo Crandall M.D.



REFERRING PHYSICIAN:  Clayton Moon M.D.



REASON FOR CONSULTATION:  Management of syncope.



HISTORY OF PRESENT ILLNESS:  The patient is a very unfortunate 67-year-old

female, who was brought in by EMS after the primary care physician in the

[facility], decision made following an episode of

syncopal event that happened when the patient was receiving massage.  The

patient denies any syncopal event.  She claims that she did not lose

consciousness, however, according to the note, the patient did have loss

of consciousness on arrival to the hospital.  She was tachycardic at a

rate of 102 and despite three liters of IV fluid in the emergency

department, tachycardia persisted.  She was admitted to telemetry for

further evaluation and management.



She was recently diagnosed with infective endocarditis at the hospital

in [place] and discharged after one week of therapy with IV

antibiotics to [facility].  She currently denies any

chest pain or shortness of breath.  She states that she was walking in the

facility without having any dyspnea on exertion or feeling dizziness or

lightheadedness.



PAST MEDICAL HISTORY:  Infective endocarditis, hyperlipidemia,

osteoarthritis, spinal stenosis, spondylolisthesis, history of asthma,

history of diabetes mellitus, history of bipolar disorder/history of major

depression.



PAST SURGICAL HISTORY:  Right arm PICC placement.



MEDICATIONS:  List of medications include acetaminophen 650 mg q.4 h.

p.r.n. pain, albuterol inhaler 3 mL inhaler q.4 h. p.r.n. shortness of

breath, aspirin 81 mg daily, atorvastatin 40 mg daily at bedtime,

cephalexin 500 mg q.6 h. p.o., Colace 100 mg p.o. twice daily, vancomycin

100 mg oral q.12 h., Lovenox 40 mg subcutaneously twice daily, fluoxetine

20 mg p.o. daily, gentamicin 60 mg IV b.i.d., Norco 5/325 mg one tablet

q.6 h. p.r.n. pain, metformin 850 mg twice daily, Zyprexa 5 mg p.o. daily

at bedtime, and vancomycin 1.5 g IV b.i.d.



SOCIAL HISTORY:  Denies any tobacco, alcohol, or illicit drug use.



FAMILY HISTORY:  No premature coronary artery disease or arrhythmogenic

death in a first-degree relative.



REVIEW OF SYSTEMS:  HEENT:  Denies any headache, diplopia, or blurred

vision.    CONSTITUTIONAL:  Denies any fever, chills, night sweats, or

weight loss.  CARDIOVASCULAR:  Denies any chest pain, shortness breath,

PND, or orthopnea.  Denies any palpitation.  Positive for syncope.

PULMONARY:  Denies any cough, hemoptysis, or wheezing.  GASTROINTESTINAL:

Denies any nausea, vomiting, diarrhea, constipation, abdominal pain, or GI

bleed.    GENITOURINARY:  Denies any hematuria, dysuria, or incontinence.

NEUROLOGIC:  Denies any motor dysfunction, sensory deficit, or altered

speech.  Positive syncope.



PHYSICAL EXAMINATION:

VITAL SIGNS:  Blood pressure was 112/50, pulse 102, respirations 20,

temperature 98.8 degrees Fahrenheit, and O2 saturation 97% on room air.

GENERAL:  The patient is a very pleasant 67-year-old female, in no apparent

respiratory distress, alert and oriented x4.

HEENT:  Atraumatic and normocephalic.  ENT, pupils are equal, round, and

reactive to light and accommodation.  Extraocular muscles are intact.

NECK:  JVP less than 5 cm.  No carotid bruits.  Carotid upstrokes 2+

bilaterally.

CARDIOVASCULAR:  Normal S1 and S2.  Regular rate and rhythm.  No murmurs,

gallops, or rubs.  PMI is at fourth intercostal space in the midclavicular

line.

LUNGS:  Clear to auscultation bilaterally.

ABDOMEN:  Soft, nontender, and nondistended.  No hepatosplenomegaly.

Positive bowel sounds.

EXTREMITIES:  No evidence of edema, clubbing, or cyanosis.



DIAGNOSTIC DATA:  A 12-lead electrocardiogram shows sinus tachycardia, rate

of 105 with QT prolongation, no evidence of ST and T-wave abnormalities.



Chest x-ray showed no acute cardiopulmonary disease.



LABORATORY FINDINGS:  WBC is 3.8, hemoglobin of 8.0, hematocrit 25.9, and

platelet count 175,000.  Sodium is 138, potassium 3.9, chloride 105,

bicarbonate 24, BUN of 19, creatinine 0.9, glucose is 147, and calcium is

9.1.  Troponin I is 0.128.  INR is 1.1.



ASSESSMENT AND PLAN:  The patient is a very unfortunate 67-year-old lady,

who was seen in Cardiology consultation at the request of Dr. Moon.



1. Syncopal event.  The patient denies it, however, given the history of

infective endocarditis, we would like to give a benefit of doubt and do a

full workup of syncope including 2D echocardiography for assessment of

left ventricular systolic function as well as valvular function in

identifying the culprit valve that is causing the infective endocarditis.

Immunological phenomenon associated with infective endocarditis needs to

be evaluated.  The patient also requires carotid duplex artery,

orthostatic measurements, and possible Neurology evaluation.

2. Sinus tachycardia, unclear etiology.  We would like to obtain blood

cultures.  Infectious Disease consultation.  Hydration, orthostasis may

help ruling out dehydration.

3. History of infective endocarditis.  It is not clear, which valve was

involved.  We will obtain 2D echocardiography for assessment of this

condition.

4. Further therapeutic and diagnostic decision will be based on the

results of the above studies.



I would like to thank, Dr. Moon, for allowing me to participate in

the care of this patient.









  ______________________________________________

  Chivo Crandall M.D.





DR:  KM/as

D:  11/10/2017 18:37

T:  11/11/2017 03:35

JOB#:  [id number]

CC:

## 2017-11-12 VITALS — SYSTOLIC BLOOD PRESSURE: 106 MMHG | DIASTOLIC BLOOD PRESSURE: 49 MMHG

## 2017-11-12 VITALS — DIASTOLIC BLOOD PRESSURE: 50 MMHG | SYSTOLIC BLOOD PRESSURE: 110 MMHG

## 2017-11-12 VITALS — SYSTOLIC BLOOD PRESSURE: 101 MMHG | DIASTOLIC BLOOD PRESSURE: 42 MMHG

## 2017-11-12 VITALS — SYSTOLIC BLOOD PRESSURE: 123 MMHG | DIASTOLIC BLOOD PRESSURE: 61 MMHG

## 2017-11-12 VITALS — SYSTOLIC BLOOD PRESSURE: 121 MMHG | DIASTOLIC BLOOD PRESSURE: 54 MMHG

## 2017-11-12 VITALS — DIASTOLIC BLOOD PRESSURE: 54 MMHG | SYSTOLIC BLOOD PRESSURE: 123 MMHG

## 2017-11-12 LAB
%HYPO/RBC NFR BLD AUTO: (no result) %
ANION GAP SERPL CALC-SCNC: 8 MMOL/L (ref 5–15)
ANISOCYTOSIS BLD QL SMEAR: (no result)
BASOPHILS NFR BLD MANUAL: 0 % (ref 0–2)
CALCIUM SERPL-MCNC: 9.2 MG/DL (ref 8.5–10.1)
CHLORIDE SERPL-SCNC: 107 MMOL/L (ref 98–107)
CO2 SERPL-SCNC: 24 MMOL/L (ref 21–32)
CREAT SERPL-MCNC: 0.8 MG/DL (ref 0.55–1.3)
EOSINOPHIL NFR BLD MANUAL: 0 % (ref 0–3)
ERYTHROCYTE [DISTWIDTH] IN BLOOD BY AUTOMATED COUNT: 14.7 % (ref 11.6–14.8)
GFR SERPLBLD BASED ON 1.73 SQ M-ARVRAT: > 60 ML/MIN (ref 60–?)
MCH RBC QN AUTO: 29.4 PG (ref 27–31)
MCHC RBC AUTO-ENTMCNC: 32.6 G/DL (ref 32–36)
MCV RBC AUTO: 90 FL (ref 80–99)
NEUTS BAND NFR BLD MANUAL: 0 % (ref 0–8)
NEUTS BAND NFR BLD MANUAL: 69 % (ref 45–75)
PLAT MORPH BLD: NORMAL
PLATELET # BLD EST: ADEQUATE 10*3/UL
PLATELET # BLD: 206 K/UL (ref 150–450)
PMV BLD AUTO: 6.8 FL (ref 6.5–10.1)
POTASSIUM SERPL-SCNC: 3.8 MMOL/L (ref 3.5–5.1)
RBC # BLD AUTO: 3.03 M/UL (ref 4.2–5.4)
SODIUM SERPL-SCNC: 139 MMOL/L (ref 136–145)
TOTAL CELLS COUNTED BLD: 100
VARIANT LYMPHS NFR BLD MANUAL: 27 % (ref 20–45)
WBC # BLD AUTO: 2.8 K/UL (ref 4.8–10.8)

## 2017-11-12 RX ADMIN — Medication SCH MLS/HR: at 02:25

## 2017-11-12 RX ADMIN — ASPIRIN SCH MG: 81 TABLET, DELAYED RELEASE ORAL at 08:29

## 2017-11-12 RX ADMIN — DOCUSATE SODIUM SCH MG: 100 CAPSULE, LIQUID FILLED ORAL at 08:29

## 2017-11-12 RX ADMIN — SODIUM CHLORIDE SCH MLS/HR: 0.9 INJECTION INTRAVENOUS at 16:02

## 2017-11-12 RX ADMIN — Medication SCH MLS/HR: at 13:58

## 2017-11-12 RX ADMIN — CLOBETASOL PROPIONATE SCH APPLIC: 0.5 OINTMENT TOPICAL at 20:57

## 2017-11-12 RX ADMIN — ENOXAPARIN SODIUM SCH MG: 40 INJECTION SUBCUTANEOUS at 08:31

## 2017-11-12 RX ADMIN — SODIUM CHLORIDE SCH MLS/HR: 0.9 INJECTION INTRAVENOUS at 04:26

## 2017-11-12 RX ADMIN — DIPHENHYDRAMINE HYDROCHLORIDE PRN MG: 50 INJECTION INTRAMUSCULAR; INTRAVENOUS at 16:37

## 2017-11-12 RX ADMIN — DOCUSATE SODIUM SCH MG: 100 CAPSULE, LIQUID FILLED ORAL at 17:03

## 2017-11-12 RX ADMIN — CHLORHEXIDINE GLUCONATE SCH APPLIC: 213 SOLUTION TOPICAL at 20:19

## 2017-11-12 RX ADMIN — DIPHENHYDRAMINE HYDROCHLORIDE PRN MG: 50 INJECTION INTRAMUSCULAR; INTRAVENOUS at 08:32

## 2017-11-12 RX ADMIN — ATORVASTATIN CALCIUM SCH MG: 20 TABLET, FILM COATED ORAL at 20:53

## 2017-11-12 RX ADMIN — CLOBETASOL PROPIONATE SCH APPLIC: 0.5 OINTMENT TOPICAL at 08:30

## 2017-11-12 NOTE — DIAGNOSTIC IMAGING REPORT
Indication: Chest pain



Comparison: None



Technique: Contiguous helical CT images through the chest was performed the use 

of

intravenous contrast optimized for opacification of the pulmonary arterial tree.

Axial, coronal and sagittal reconstructions were reformatted. Using a separate 

3-D

workstation, 3-D imaging of the pulmonary arteries was also performed.



Findings:



There is adequate opacification of the pulmonary arterial tree. There is no evidence

of pulmonary embolus. Thoracic aorta is normal with no evidence of aneurysm 

or

dissection.



Heart size is mildly enlarged. Small pericardial effusion is seen.



Small to moderate sized bilateral pleural effusions are present. Lungs are clear. 

No

infiltrates. No pulmonary nodules. Some atelectasis and/or scarring is seen in the

lung bases. There is slightly increased density seen throughout the mediastinal fat

which is presumably related to fluid/pleural fluid however other etiologies such 

as

diffuse adenopathy cannot be excluded.



No acute osseous abnormalities. Degenerative changes are seen throughout the

thoracic spine.



Impression:



No evidence of pulmonary embolus.



Thoracic aorta is normal without aneurysm or dissection.



Small to moderate-sized bilateral pleural effusions.



Bibasilar atelectasis and/or scarring. Otherwise lungs are clear. No infiltrates 

or

nodules.



There is slightly increased density seen throughout the mediastinal fat which 

is

presumably related to fluid/pleural fluid however other etiologies such as 

diffuse

adenopathy cannot be excluded.

## 2017-11-12 NOTE — INFECTIOUS DISEASES PROG NOTE
Assessment/Plan


Assessment/Plan


A;


Aortic valve endocarditis


Syncope


Allergic drug reaction to Keflex


DM type 2


Anemia


P:


Will continue her already established treatment for endocarditis with  

Doxycycline (until 2/5/18) ,


Gentamicin 60mg IV bid (until 11/19/11), IV Vancomycin (until 12/12/17)





Subjective


ROS Limited/Unobtainable:  No


Constitutional:  Reports: no symptoms


Cardiovascular:  Reports: no symptoms


Gastrointestinal/Abdominal:  Reports: no symptoms


Genitourinary:  Reports: no symptoms


Skin:  Reports: rash


Allergies:  


Coded Allergies:  


     CEPHALEXIN (Verified  Allergy, Intermediate, diffuse maculopapular rash, 11 /11/17)


Uncoded Allergies:  


     Baby oil (Allergy, Severe, Itching, 11/10/17)





Objective


Vital Signs





Last 24 Hour Vital Signs








  Date Time  Temp Pulse Resp B/P (MAP) Pulse Ox O2 Delivery O2 Flow Rate FiO2


 


11/12/17 08:00  109      


 


11/12/17 08:00 98.2 107 16 121/54 94 Room Air  


 


11/12/17 07:55  110 20   Room Air  


 


11/12/17 04:20 98.2 106 20 101/42 94 Nasal Cannula 2.0 


 


11/12/17 04:00  105      


 


11/12/17 00:21 99.3 112 20 106/49 94 Room Air  


 


11/12/17 00:00  117      


 


11/11/17 20:15 98.8 113 20 114/54 94 Room Air  


 


11/11/17 20:00  115      


 


11/11/17 19:52  117 18   Room Air  


 


11/11/17 16:00  113      


 


11/11/17 16:00 99.5 112 20 115/52 96 Room Air  


 


11/11/17 12:00  113      


 


11/11/17 12:00 99.3 112 20 126/51 93 Room Air  








Height (Feet):  5


Height (Inches):  4.00


Weight (Pounds):  140


General Appearance:  no acute distress


HEENT:  mucous membranes moist


Respiratory/Chest:  lungs clear


Cardiovascular:  tachycardia


Abdomen:  soft, non tender


Extremities:  no edema


Skin:  rash, other - generalized maculopapuler rash


Neurologic/Psychiatric:  alert, oriented x 3, responsive





Microbiology








 Date/Time


Source Procedure


Growth Status


 


 


 11/10/17 15:25


Blood Blood Culture - Preliminary


NO GROWTH AFTER 24 HOURS Resulted


 


 11/10/17 15:15


Blood Blood Culture - Preliminary


NO GROWTH AFTER 24 HOURS Resulted











Laboratory Tests








Test


  11/12/17


06:10


 


White Blood Count


  2.8 K/UL


(4.8-10.8)  L


 


Red Blood Count


  3.03 M/UL


(4.20-5.40)  L


 


Hemoglobin


  8.9 G/DL


(12.0-16.0)  L


 


Hematocrit


  27.3 %


(37.0-47.0)  L


 


Mean Corpuscular Volume 90 FL (80-99)  


 


Mean Corpuscular Hemoglobin


  29.4 PG


(27.0-31.0)


 


Mean Corpuscular Hemoglobin


Concent 32.6 G/DL


(32.0-36.0)


 


Red Cell Distribution Width


  14.7 %


(11.6-14.8)


 


Platelet Count


  206 K/UL


(150-450)


 


Mean Platelet Volume


  6.8 FL


(6.5-10.1)


 


Neutrophils (%) (Auto)


  % (45.0-75.0)


 


 


Lymphocytes (%) (Auto)


  % (20.0-45.0)


 


 


Monocytes (%) (Auto)  % (1.0-10.0)  


 


Eosinophils (%) (Auto)  % (0.0-3.0)  


 


Basophils (%) (Auto)  % (0.0-2.0)  


 


Differential Total Cells


Counted 100  


 


 


Neutrophils % (Manual) 69 % (45-75)  


 


Lymphocytes % (Manual) 27 % (20-45)  


 


Monocytes % (Manual) 4 % (1-10)  


 


Eosinophils % (Manual) 0 % (0-3)  


 


Basophils % (Manual) 0 % (0-2)  


 


Band Neutrophils 0 % (0-8)  


 


Platelet Estimate Adequate  


 


Platelet Morphology Normal  


 


Hypochromasia 2+  


 


Anisocytosis 1+  


 


Erythrocyte Sedimentation Rate


  120 MM/HR


(0-30)  H


 


Sodium Level


  139 MMOL/L


(136-145)


 


Potassium Level


  3.8 MMOL/L


(3.5-5.1)


 


Chloride Level


  107 MMOL/L


()


 


Carbon Dioxide Level


  24 MMOL/L


(21-32)


 


Anion Gap


  8 mmol/L


(5-15)


 


Blood Urea Nitrogen


  15 mg/dL


(7-18)


 


Creatinine


  0.8 MG/DL


(0.55-1.30)


 


Estimat Glomerular Filtration


Rate > 60 mL/min


(>60)


 


Glucose Level


  144 MG/DL


()  H


 


Calcium Level


  9.2 MG/DL


(8.5-10.1)


 


C-Reactive Protein,


Quantitative 1.3 mg/dL


(0.00-0.90)  H











Current Medications








 Medications


  (Trade)  Dose


 Ordered  Sig/Lakshmi


 Route


 PRN Reason  Start Time


 Stop Time Status Last Admin


Dose Admin


 


 Acetaminophen


  (Tylenol)  650 mg  Q4H  PRN


 ORAL


 For Pain  11/10/17 21:00


 12/10/17 20:59   


 


 


 Acetaminophen/


 Hydrocodone Bitart


  (Norco 5/325)  1 tab  Q6H  PRN


 ORAL


 For Moderate to Severe Pain  11/10/17 21:00


 11/17/17 20:59   


 


 


 Albuterol Sulfate


  (Proventil)  2.5 mg  Q4H  PRN


 HHN


 Shortness of Breath  11/10/17 21:00


 11/15/17 20:59   


 


 


 Aspirin


  (Ecotrin)  81 mg  DAILY


 ORAL


   11/11/17 09:00


 12/11/17 08:59  11/12/17 08:29


 


 


 Atorvastatin


 Calcium


  (Lipitor)  40 mg  BEDTIME


 ORAL


   11/10/17 22:30


 12/10/17 22:29  11/11/17 21:12


 


 


 Chlorhexidine


 Gluconate


  (Althea-Hex 2%)  1 applic  DAILY@2000


 TOPIC


   11/11/17 20:00


 12/11/17 19:59  11/11/17 21:12


 


 


 Clobetasol


 Propionate


  (Temovate)  1 applic  TWICE A  DAY


 TOPIC


   11/11/17 18:00


 12/11/17 17:59  11/12/17 08:30


 


 


 Diphenhydramine


 HCl


  (Benadryl)  25 mg  Q6H  PRN


 IVP


 Itching  11/10/17 22:00


 12/10/17 21:59  11/12/17 08:32


 


 


 Docusate Sodium


  (Colace)  100 mg  TWICE A  DAY


 ORAL


   11/11/17 09:00


 12/11/17 08:59  11/12/17 08:29


 


 


 Doxycycline


 Monohydrate


  (Vibramycin)  100 mg  EVERY 12  HOURS


 ORAL


   11/10/17 22:30


 11/17/17 22:29  11/12/17 08:29


 


 


 Enoxaparin Sodium


  (Lovenox)  40 mg  DAILY


 SUBQ


   11/11/17 13:00


 12/11/17 12:59  11/12/17 08:31


 


 


 Fluoxetine HCl


  (PROzac)  20 mg  DAILY


 ORAL


   11/11/17 09:00


 12/11/17 08:59  11/12/17 08:29


 


 


 Gentamicin


 Protocol


  (Gentamicin


 pharmacy to dose)  1 ea  DAILY  PRN


 MISC


 Per rx protocol  11/11/17 13:00


 11/19/17 23:55   


 


 


 Gentamicin


 Sulfate 60 mg/


 Sodium Chloride  56.5 ml @ 


 110 mls/hr  Q12H


 IVPB


   11/11/17 16:00


 11/18/17 15:59  11/12/17 04:26


 


 


 Metformin HCl


  (Glucophage)  850 mg  BID


 ORAL


   11/11/17 09:00


 12/11/17 08:59  11/11/17 18:05


 


 


 Olanzapine


  (ZyPREXA)  5 mg  BEDTIME


 ORAL


   11/10/17 22:30


 12/10/17 22:29  11/11/17 21:12


 


 


 Vancomycin HCl


  (Vanco rx to


 dose)  1 ea  DAILY  PRN


 MISC


 Per rx protocol  11/11/17 13:00


 12/11/17 12:59   


 


 


 Vancomycin/Sodium


 Chloride  250 ml @ 


 166.667


 mls/hr  Q12H


 IVPB


   11/12/17 02:00


 11/17/17 01:59  11/12/17 02:25


 

















SANTI GAO Nov 12, 2017 12:03

## 2017-11-12 NOTE — GENERAL PROGRESS NOTE
Assessment/Plan


Status:  unchanged


Assessment/Plan


1. Acute encephlopathy


2. Sinus Tachy cardia


3. Pleural effusion


4. Pericardial effusion


5. Endocarditis.


6. Iatrogenic skin rashes


4. Diabetes type 2.


5. Hypertension.


6. Hypercholesterolemia.


7. Asthma.


8. Osteoarthritis of the bilateral knees.


9. Chronic low back pain.


10. GI-DVT prophylaxia





Plan:


case discussed with cardiology Dr Crandall. Dr sinclair consulted.





Subjective


ROS Limited/Unobtainable:  No


Constitutional:  Reports: malaise


HEENT:  Reports: no symptoms


Cardiovascular:  Reports: no symptoms


Allergies:  


Coded Allergies:  


     CEPHALEXIN (Verified  Allergy, Intermediate, diffuse maculopapular rash, 11 /11/17)


Uncoded Allergies:  


     Baby oil (Allergy, Severe, Itching, 11/10/17)





Objective





Last 24 Hour Vital Signs








  Date Time  Temp Pulse Resp B/P (MAP) Pulse Ox O2 Delivery O2 Flow Rate FiO2


 


11/12/17 08:00  109      


 


11/12/17 07:55  110 20   Room Air  


 


11/12/17 04:20 98.2 106 20 101/42 94 Nasal Cannula 2.0 


 


11/12/17 04:00  105      


 


11/12/17 00:21 99.3 112 20 106/49 94 Room Air  


 


11/12/17 00:00  117      


 


11/11/17 20:15 98.8 113 20 114/54 94 Room Air  


 


11/11/17 20:00  115      


 


11/11/17 19:52  117 18   Room Air  


 


11/11/17 16:00  113      


 


11/11/17 16:00 99.5 112 20 115/52 96 Room Air  


 


11/11/17 12:00  113      


 


11/11/17 12:00 99.3 112 20 126/51 93 Room Air  








Laboratory Tests


11/12/17 06:10: 


White Blood Count 2.8L, Red Blood Count 3.03L, Hemoglobin 8.9L, Hematocrit 27.3L

, Mean Corpuscular Volume 90, Mean Corpuscular Hemoglobin 29.4, Mean 

Corpuscular Hemoglobin Concent 32.6, Red Cell Distribution Width 14.7, Platelet 

Count 206, Mean Platelet Volume 6.8, Neutrophils (%) (Auto) , Lymphocytes (%) (

Auto) , Monocytes (%) (Auto) , Eosinophils (%) (Auto) , Basophils (%) (Auto) , 

Differential Total Cells Counted 100, Neutrophils % (Manual) 69, Lymphocytes % (

Manual) 27, Monocytes % (Manual) 4, Eosinophils % (Manual) 0, Basophils % (

Manual) 0, Band Neutrophils 0, Platelet Estimate Adequate, Platelet Morphology 

Normal, Hypochromasia 2+, Anisocytosis 1+, Erythrocyte Sedimentation Rate 120H, 

Sodium Level 139, Potassium Level 3.8, Chloride Level 107, Carbon Dioxide Level 

24, Anion Gap 8, Blood Urea Nitrogen 15, Creatinine 0.8, Estimat Glomerular 

Filtration Rate > 60, Glucose Level 144H, Calcium Level 9.2, C-Reactive Protein

, Quantitative 1.3H


Height (Feet):  5


Height (Inches):  4.00


Weight (Pounds):  140


General Appearance:  no apparent distress


EENT:  PERRL/EOMI


Neck:  supple


Cardiovascular:  tachycardia


Abdomen:  soft


Extremities:  non-tender


Neurologic:  CNs II-XII grossly normal











Clayton Moon MD Nov 12, 2017 10:13

## 2017-11-12 NOTE — CONSULTATION
History of Present Illness


General


Date patient seen:  Nov 12, 2017


Chief Complaint:  Syncope


Referring physician:  Dr. Moon


Reason for Consultation:  abnormal cxr





Present Illness


HPI


 67-year-old female with hx of endocarditis, brought in by EMS from a nursing 

home for a recent syncopal episode.  The patient had been undergoing IV 

antibiotic treatment for infective endocarditis.    The patient denies any 

current symptoms.  She denies prior syncopal episodes.  Patient stated that she 

had been having a rash to baby oil. Her initial cxr showed cardiomegaly and 

possible pleural effusion.  I was asked to evaluate her.


Allergies:  


Coded Allergies:  


     CEPHALEXIN (Verified  Allergy, Intermediate, diffuse maculopapular rash, 11 /11/17)


Uncoded Allergies:  


     Baby oil (Allergy, Severe, Itching, 11/10/17)





Medication History


Scheduled


Aspirin* (Aspir 81*), 81 MG ORAL DAILY, (Reported)


Atorvastatin Calcium* (Atorvastatin Calcium*), 40 MG ORAL BEDTIME, (Reported)


Cephalexin* (Cephalexin*), 500 MG ORAL EVERY 6 HOURS, (Reported)


Docusate Sodium* (Colace*), 100 MG ORAL TWICE A DAY, (Reported)


Doxycycline Hyclate* (Vibramycin*), 100 MG ORAL EVERY 12 HOURS, (Reported)


Enoxaparin* (Lovenox*), 40 MG SUBQ BID, (Reported)


Fluoxetine Hcl* (Fluoxetine Hcl*), 20 MG ORAL DAILY, (Reported)


Gentamicin In Nacl, Iso-Osm (Gentamicin 70 Mg/Ns 50 Ml Pb), 60 MG IV BID, (

Reported)


Metformin Hcl* (Metformin Hcl*), 850 MG ORAL BID, (Reported)


Olanzapine* (Zyprexa*), 5 MG ORAL BEDTIME, (Reported)


Vancomycin HCl/D5w (Vancomycin 1.5 Gram/250 ml-D5w), 1.5 GM IV BID, (Reported)


[Lispro], Unknown Dose SUBQ BEFORE MEALS, (Reported)





Scheduled PRN


Acetaminophen* (Acetaminophen 325MG Tablet*), 650 MG ORAL Q4H PRN for For Pain, 

(Reported)


Albuterol Sulfate* (Albuterol Sulfate Hhn*), 3 ML INH Q4H PRN for Shortness of 

Breath, (Reported)


Hydrocodone Bit/Acetaminophen 5-325* (Norco 5-325*), 1 TAB ORAL Q6H PRN for For 

Pain, (Reported)





Patient History


Healthcare decision maker


N


Resuscitation status


Full Code


Advanced Directive on File








Past Medical/Surgical History


Past Medical/Surgical History:  


(1) Endocarditis





Review of Systems


All Other Systems:  negative except mentioned in HPI





Physical Exam


General Appearance:  WD/WN


Lines, tubes and drains:  peripheral


HEENT:  normocephalic, atraumatic


Neck:  non-tender, normal alignment


Respiratory/Chest:  chest wall non-tender, normal breath sounds


Cardiovascular/Chest:  normal peripheral pulses, normal rate


Abdomen:  normal bowel sounds, non tender


Genitourinary/Rectal:  normal genital exam


Extremities:  normal range of motion, non-tender


Skin Exam:  normal pigmentation


Neurologic:  CNs II-XII grossly normal





Last 24 Hour Vital Signs








  Date Time  Temp Pulse Resp B/P (MAP) Pulse Ox O2 Delivery O2 Flow Rate FiO2


 


11/12/17 08:00  109      


 


11/12/17 07:55  110 20   Room Air  


 


11/12/17 04:20 98.2 106 20 101/42 94 Nasal Cannula 2.0 


 


11/12/17 04:00  105      


 


11/12/17 00:21 99.3 112 20 106/49 94 Room Air  


 


11/12/17 00:00  117      


 


11/11/17 20:15 98.8 113 20 114/54 94 Room Air  


 


11/11/17 20:00  115      


 


11/11/17 19:52  117 18   Room Air  


 


11/11/17 16:00  113      


 


11/11/17 16:00 99.5 112 20 115/52 96 Room Air  


 


11/11/17 12:00  113      


 


11/11/17 12:00 99.3 112 20 126/51 93 Room Air  











Laboratory Tests








Test


  11/12/17


06:10


 


White Blood Count


  2.8 K/UL


(4.8-10.8)  L


 


Red Blood Count


  3.03 M/UL


(4.20-5.40)  L


 


Hemoglobin


  8.9 G/DL


(12.0-16.0)  L


 


Hematocrit


  27.3 %


(37.0-47.0)  L


 


Mean Corpuscular Volume 90 FL (80-99)  


 


Mean Corpuscular Hemoglobin


  29.4 PG


(27.0-31.0)


 


Mean Corpuscular Hemoglobin


Concent 32.6 G/DL


(32.0-36.0)


 


Red Cell Distribution Width


  14.7 %


(11.6-14.8)


 


Platelet Count


  206 K/UL


(150-450)


 


Mean Platelet Volume


  6.8 FL


(6.5-10.1)


 


Neutrophils (%) (Auto)


  % (45.0-75.0)


 


 


Lymphocytes (%) (Auto)


  % (20.0-45.0)


 


 


Monocytes (%) (Auto)  % (1.0-10.0)  


 


Eosinophils (%) (Auto)  % (0.0-3.0)  


 


Basophils (%) (Auto)  % (0.0-2.0)  


 


Differential Total Cells


Counted 100  


 


 


Neutrophils % (Manual) 69 % (45-75)  


 


Lymphocytes % (Manual) 27 % (20-45)  


 


Monocytes % (Manual) 4 % (1-10)  


 


Eosinophils % (Manual) 0 % (0-3)  


 


Basophils % (Manual) 0 % (0-2)  


 


Band Neutrophils 0 % (0-8)  


 


Platelet Estimate Adequate  


 


Platelet Morphology Normal  


 


Hypochromasia 2+  


 


Anisocytosis 1+  


 


Erythrocyte Sedimentation Rate


  120 MM/HR


(0-30)  H


 


Sodium Level


  139 MMOL/L


(136-145)


 


Potassium Level


  3.8 MMOL/L


(3.5-5.1)


 


Chloride Level


  107 MMOL/L


()


 


Carbon Dioxide Level


  24 MMOL/L


(21-32)


 


Anion Gap


  8 mmol/L


(5-15)


 


Blood Urea Nitrogen


  15 mg/dL


(7-18)


 


Creatinine


  0.8 MG/DL


(0.55-1.30)


 


Estimat Glomerular Filtration


Rate > 60 mL/min


(>60)


 


Glucose Level


  144 MG/DL


()  H


 


Calcium Level


  9.2 MG/DL


(8.5-10.1)


 


C-Reactive Protein,


Quantitative 1.3 mg/dL


(0.00-0.90)  H








Height (Feet):  5


Height (Inches):  4.00


Weight (Pounds):  140


Medications





Current Medications








 Medications


  (Trade)  Dose


 Ordered  Sig/Lakshmi


 Route


 PRN Reason  Start Time


 Stop Time Status Last Admin


Dose Admin


 


 Acetaminophen


  (Tylenol)  650 mg  Q4H  PRN


 ORAL


 For Pain  11/10/17 21:00


 12/10/17 20:59   


 


 


 Acetaminophen/


 Hydrocodone Bitart


  (Norco 5/325)  1 tab  Q6H  PRN


 ORAL


 For Moderate to Severe Pain  11/10/17 21:00


 11/17/17 20:59   


 


 


 Albuterol Sulfate


  (Proventil)  2.5 mg  Q4H  PRN


 HHN


 Shortness of Breath  11/10/17 21:00


 11/15/17 20:59   


 


 


 Aspirin


  (Ecotrin)  81 mg  DAILY


 ORAL


   11/11/17 09:00


 12/11/17 08:59  11/12/17 08:29


 


 


 Atorvastatin


 Calcium


  (Lipitor)  40 mg  BEDTIME


 ORAL


   11/10/17 22:30


 12/10/17 22:29  11/11/17 21:12


 


 


 Chlorhexidine


 Gluconate


  (Althea-Hex 2%)  1 applic  DAILY@2000


 TOPIC


   11/11/17 20:00


 12/11/17 19:59  11/11/17 21:12


 


 


 Clobetasol


 Propionate


  (Temovate)  1 applic  TWICE A  DAY


 TOPIC


   11/11/17 18:00


 12/11/17 17:59  11/12/17 08:30


 


 


 Diphenhydramine


 HCl


  (Benadryl)  25 mg  Q6H  PRN


 IVP


 Itching  11/10/17 22:00


 12/10/17 21:59  11/12/17 08:32


 


 


 Docusate Sodium


  (Colace)  100 mg  TWICE A  DAY


 ORAL


   11/11/17 09:00


 12/11/17 08:59  11/12/17 08:29


 


 


 Doxycycline


 Monohydrate


  (Vibramycin)  100 mg  EVERY 12  HOURS


 ORAL


   11/10/17 22:30


 11/17/17 22:29  11/12/17 08:29


 


 


 Enoxaparin Sodium


  (Lovenox)  40 mg  DAILY


 SUBQ


   11/11/17 13:00


 12/11/17 12:59  11/12/17 08:31


 


 


 Fluoxetine HCl


  (PROzac)  20 mg  DAILY


 ORAL


   11/11/17 09:00


 12/11/17 08:59  11/12/17 08:29


 


 


 Gentamicin


 Protocol


  (Gentamicin


 pharmacy to dose)  1 ea  DAILY  PRN


 MISC


 Per rx protocol  11/11/17 13:00


 11/19/17 23:55   


 


 


 Gentamicin


 Sulfate 60 mg/


 Sodium Chloride  56.5 ml @ 


 110 mls/hr  Q12H


 IVPB


   11/11/17 16:00


 11/18/17 15:59  11/12/17 04:26


 


 


 Metformin HCl


  (Glucophage)  850 mg  BID


 ORAL


   11/11/17 09:00


 12/11/17 08:59  11/11/17 18:05


 


 


 Olanzapine


  (ZyPREXA)  5 mg  BEDTIME


 ORAL


   11/10/17 22:30


 12/10/17 22:29  11/11/17 21:12


 


 


 Vancomycin HCl


  (Vanco rx to


 dose)  1 ea  DAILY  PRN


 MISC


 Per rx protocol  11/11/17 13:00


 12/11/17 12:59   


 


 


 Vancomycin/Sodium


 Chloride  250 ml @ 


 166.667


 mls/hr  Q12H


 IVPB


   11/12/17 02:00


 11/17/17 01:59  11/12/17 02:25


 











Assessment/Plan


Problem List:  


(1) Cardiomegaly


ICD Codes:  I51.7 - Cardiomegaly


SNOMED:  9338309


(2) Syncope


ICD Codes:  R55 - Syncope and collapse


SNOMED:  778557492


(3) Anemia


ICD Codes:  D64.9 - Anemia, unspecified


SNOMED:  192919178


Assessment/Plan


CXR showing minimal effusion, I will repeat the it in the morning.


anemia w/u including OB, iron studies ordered


continue abx, F/u with ID











AP LINDO Nov 12, 2017 10:30

## 2017-11-12 NOTE — CARDIOLOGY PROGRESS NOTE
Assessment/Plan


Assessment/Plan


1. Sinus tachycardia, ? acute AR, if persistent would have to repeat KITTY.


2. Possible eccentric AR, cannot assess severity by echo.


3. Aortic valve endocarditis on IV antibiotics.


4. Bilateral pleural effusion


5. Possible syncopal event, normal EF, PE ruled out, ?neurocardiogenic ? 

hypovolemic.





Subjective


Subjective


Sinus tachycardia at 110.





Objective





Last 24 Hour Vital Signs








  Date Time  Temp Pulse Resp B/P (MAP) Pulse Ox O2 Delivery O2 Flow Rate FiO2


 


11/12/17 12:00 97.2 103 18 123/54 97 Room Air  


 


11/12/17 12:00  111      


 


11/12/17 08:00  109      


 


11/12/17 08:00 98.2 107 16 121/54 94 Room Air  


 


11/12/17 07:55  110 20   Room Air  


 


11/12/17 04:20 98.2 106 20 101/42 94 Nasal Cannula 2.0 


 


11/12/17 04:00  105      


 


11/12/17 00:21 99.3 112 20 106/49 94 Room Air  


 


11/12/17 00:00  117      


 


11/11/17 20:15 98.8 113 20 114/54 94 Room Air  


 


11/11/17 20:00  115      


 


11/11/17 19:52  117 18   Room Air  

















Intake and Output  


 


 11/12/17 11/13/17





 18:59 06:59


 


  


 


# Bowel Movements 1 








2D Echo:  LVEF 50%, Poss eccentric AR ?severity,RVSP 42 mmHg, Pseudo-normal LV 

physio





Laboratory Tests








Test


  11/12/17


06:10 11/12/17


15:30


 


White Blood Count


  2.8 K/UL


(4.8-10.8)  L 


 


 


Red Blood Count


  3.03 M/UL


(4.20-5.40)  L 


 


 


Hemoglobin


  8.9 G/DL


(12.0-16.0)  L 


 


 


Hematocrit


  27.3 %


(37.0-47.0)  L 


 


 


Mean Corpuscular Volume 90 FL (80-99)   


 


Mean Corpuscular Hemoglobin


  29.4 PG


(27.0-31.0) 


 


 


Mean Corpuscular Hemoglobin


Concent 32.6 G/DL


(32.0-36.0) 


 


 


Red Cell Distribution Width


  14.7 %


(11.6-14.8) 


 


 


Platelet Count


  206 K/UL


(150-450) 


 


 


Mean Platelet Volume


  6.8 FL


(6.5-10.1) 


 


 


Neutrophils (%) (Auto)


  % (45.0-75.0)


  


 


 


Lymphocytes (%) (Auto)


  % (20.0-45.0)


  


 


 


Monocytes (%) (Auto)  % (1.0-10.0)   


 


Eosinophils (%) (Auto)  % (0.0-3.0)   


 


Basophils (%) (Auto)  % (0.0-2.0)   


 


Differential Total Cells


Counted 100  


  


 


 


Neutrophils % (Manual) 69 % (45-75)   


 


Lymphocytes % (Manual) 27 % (20-45)   


 


Monocytes % (Manual) 4 % (1-10)   


 


Eosinophils % (Manual) 0 % (0-3)   


 


Basophils % (Manual) 0 % (0-2)   


 


Band Neutrophils 0 % (0-8)   


 


Platelet Estimate Adequate   


 


Platelet Morphology Normal   


 


Hypochromasia 2+   


 


Anisocytosis 1+   


 


Erythrocyte Sedimentation Rate


  120 MM/HR


(0-30)  H 


 


 


Sodium Level


  139 MMOL/L


(136-145) 


 


 


Potassium Level


  3.8 MMOL/L


(3.5-5.1) 


 


 


Chloride Level


  107 MMOL/L


() 


 


 


Carbon Dioxide Level


  24 MMOL/L


(21-32) 


 


 


Anion Gap


  8 mmol/L


(5-15) 


 


 


Blood Urea Nitrogen


  15 mg/dL


(7-18) 


 


 


Creatinine


  0.8 MG/DL


(0.55-1.30) 


 


 


Estimat Glomerular Filtration


Rate > 60 mL/min


(>60) 


 


 


Glucose Level


  144 MG/DL


()  H 


 


 


Calcium Level


  9.2 MG/DL


(8.5-10.1) 


 


 


C-Reactive Protein,


Quantitative 1.3 mg/dL


(0.00-0.90)  H 


 


 


Gentamicin Level Trough


  


  0.7 ug/mL


(0.3-2.0)











Microbiology








 Date/Time


Source Procedure


Growth Status


 


 


 11/10/17 15:25


Blood Blood Culture - Preliminary


NO GROWTH AFTER 24 HOURS Resulted


 


 11/10/17 15:15


Blood Blood Culture - Preliminary


NO GROWTH AFTER 24 HOURS Resulted








Objective


GENERAL:  The patient is a very pleasant 67-year-old female, in no apparent


respiratory distress, alert and oriented x4.


HEENT:  Atraumatic and normocephalic.  ENT, pupils are equal, round, and


reactive to light and accommodation.  Extraocular muscles are intact.


NECK:  JVP less than 5 cm.  No carotid bruits.  Carotid upstrokes 2+


bilaterally.


CARDIOVASCULAR:  Normal S1 and S2.  Regular rate and rhythm.  No murmurs,


gallops, or rubs.  PMI is at fourth intercostal space in the midclavicular


line.


LUNGS:  Clear to auscultation bilaterally.


ABDOMEN:  Soft, nontender, and nondistended.  No hepatosplenomegaly.


Positive bowel sounds.


EXTREMITIES:  No evidence of edema, clubbing, or cyanosis.











SAMSON LAWTON Nov 12, 2017 17:13

## 2017-11-13 VITALS — DIASTOLIC BLOOD PRESSURE: 46 MMHG | SYSTOLIC BLOOD PRESSURE: 109 MMHG

## 2017-11-13 VITALS — SYSTOLIC BLOOD PRESSURE: 110 MMHG | DIASTOLIC BLOOD PRESSURE: 50 MMHG

## 2017-11-13 VITALS — SYSTOLIC BLOOD PRESSURE: 129 MMHG | DIASTOLIC BLOOD PRESSURE: 60 MMHG

## 2017-11-13 VITALS — SYSTOLIC BLOOD PRESSURE: 127 MMHG | DIASTOLIC BLOOD PRESSURE: 63 MMHG

## 2017-11-13 VITALS — SYSTOLIC BLOOD PRESSURE: 115 MMHG | DIASTOLIC BLOOD PRESSURE: 55 MMHG

## 2017-11-13 VITALS — SYSTOLIC BLOOD PRESSURE: 111 MMHG | DIASTOLIC BLOOD PRESSURE: 48 MMHG

## 2017-11-13 LAB
%HYPO/RBC NFR BLD AUTO: (no result) %
ALBUMIN/GLOB SERPL: 0.6 {RATIO} (ref 1–2.7)
ALT SERPL-CCNC: 18 U/L (ref 12–78)
ANION GAP SERPL CALC-SCNC: 8 MMOL/L (ref 5–15)
ANISOCYTOSIS BLD QL SMEAR: (no result)
APTT BLD: 29 SEC (ref 23–33)
AST SERPL-CCNC: 23 U/L (ref 15–37)
BASOPHILS NFR BLD MANUAL: 0 % (ref 0–2)
CALCIUM SERPL-MCNC: 8.8 MG/DL (ref 8.5–10.1)
CHLORIDE SERPL-SCNC: 107 MMOL/L (ref 98–107)
CO2 SERPL-SCNC: 23 MMOL/L (ref 21–32)
CREAT SERPL-MCNC: 0.8 MG/DL (ref 0.55–1.3)
EOSINOPHIL NFR BLD MANUAL: 0 % (ref 0–3)
ERYTHROCYTE [DISTWIDTH] IN BLOOD BY AUTOMATED COUNT: 15 % (ref 11.6–14.8)
ERYTHROCYTE [SEDIMENTATION RATE] IN BLOOD: 119 MM/HR (ref 0–30)
FOLATE SERPL-MCNC: 11.1 NG/ML (ref 3.1–17.5)
GFR SERPLBLD BASED ON 1.73 SQ M-ARVRAT: > 60 ML/MIN (ref 60–?)
GLOBULIN SER-MCNC: 4.7 G/DL
INR PPP: 1.1 (ref 0.9–1.1)
IRON SERPL-MCNC: 17 UG/DL (ref 50–175)
LDH SERPL L TO P-CCNC: 179 U/L (ref 81–234)
MCH RBC QN AUTO: 28.9 PG (ref 27–31)
MCHC RBC AUTO-ENTMCNC: 32 G/DL (ref 32–36)
MCV RBC AUTO: 90 FL (ref 80–99)
NEUTS BAND NFR BLD MANUAL: 0 % (ref 0–8)
NEUTS BAND NFR BLD MANUAL: 80 % (ref 45–75)
PATH BLOOD SMEAR/OMC: (no result)
PLAT MORPH BLD: NORMAL
PLATELET # BLD EST: ADEQUATE 10*3/UL
PLATELET # BLD: 190 K/UL (ref 150–450)
PMV BLD AUTO: 6.7 FL (ref 6.5–10.1)
POTASSIUM SERPL-SCNC: 4 MMOL/L (ref 3.5–5.1)
PROT SERPL-MCNC: 7.4 G/DL (ref 6.4–8.2)
PROTHROMBIN TIME: 11.3 SEC (ref 9.3–11.5)
RBC # BLD AUTO: 2.95 M/UL (ref 4.2–5.4)
RETICS/RBC NFR: 0.9 % (ref 0–2)
SODIUM SERPL-SCNC: 138 MMOL/L (ref 136–145)
TIBC SERPL-MCNC: 287 UG/DL (ref 250–450)
TOTAL CELLS COUNTED BLD: 100
VARIANT LYMPHS NFR BLD MANUAL: 13 % (ref 20–45)
VIT B12 SERPL-MCNC: 257 PG/ML (ref 193–986)
WBC # BLD AUTO: 3 K/UL (ref 4.8–10.8)

## 2017-11-13 PROCEDURE — B518ZZA FLUOROSCOPY OF SUPERIOR VENA CAVA, GUIDANCE: ICD-10-PCS

## 2017-11-13 PROCEDURE — 02HV33Z INSERTION OF INFUSION DEVICE INTO SUPERIOR VENA CAVA, PERCUTANEOUS APPROACH: ICD-10-PCS

## 2017-11-13 RX ADMIN — ASPIRIN SCH MG: 81 TABLET, DELAYED RELEASE ORAL at 09:29

## 2017-11-13 RX ADMIN — Medication SCH MLS/HR: at 02:22

## 2017-11-13 RX ADMIN — DOCUSATE SODIUM SCH MG: 100 CAPSULE, LIQUID FILLED ORAL at 09:31

## 2017-11-13 RX ADMIN — Medication SCH MLS/HR: at 14:27

## 2017-11-13 RX ADMIN — CLOBETASOL PROPIONATE SCH APPLIC: 0.5 OINTMENT TOPICAL at 09:31

## 2017-11-13 RX ADMIN — CLOBETASOL PROPIONATE SCH APPLIC: 0.5 OINTMENT TOPICAL at 20:51

## 2017-11-13 RX ADMIN — ATORVASTATIN CALCIUM SCH MG: 20 TABLET, FILM COATED ORAL at 20:40

## 2017-11-13 RX ADMIN — Medication SCH MLS/HR: at 23:02

## 2017-11-13 RX ADMIN — SODIUM CHLORIDE SCH MLS/HR: 0.9 INJECTION INTRAVENOUS at 04:13

## 2017-11-13 RX ADMIN — CHLORHEXIDINE GLUCONATE SCH APPLIC: 213 SOLUTION TOPICAL at 20:50

## 2017-11-13 RX ADMIN — DOCUSATE SODIUM SCH MG: 100 CAPSULE, LIQUID FILLED ORAL at 18:01

## 2017-11-13 RX ADMIN — SODIUM CHLORIDE SCH MLS/HR: 0.9 INJECTION INTRAVENOUS at 16:47

## 2017-11-13 RX ADMIN — ENOXAPARIN SODIUM SCH MG: 40 INJECTION SUBCUTANEOUS at 09:30

## 2017-11-13 NOTE — DIAGNOSTIC IMAGING REPORT
Indication: DYSPNEA



Technique: One view of the chest



Comparison: 11/10/2017



Findings: There is increased interstitial edema. No focal airspace consolidation. 

No

definite effusions. Heart remains enlarged. Right arm PICC remains



Impression: Increasing bilateral interstitial edema, over 3 days

## 2017-11-13 NOTE — PULMONOLOGY PROGRESS NOTE
Assessment/Plan


Problems:  


(1) Cardiomegaly


(2) Syncope


(3) Anemia


(4) Endocarditis


Assessment/Plan


CT chest reviewed


continue current abx for endocarditis





KITTY scheduled





keep in teli





Subjective


ROS Limited/Unobtainable:  No


Constitutional:  Reports: no symptoms


Respiratory:  Reports: no symptoms, wheezing


Gastrointestinal/Abdominal:  Reports: no symptoms


Allergies:  


Coded Allergies:  


     CEPHALEXIN (Verified  Allergy, Intermediate, diffuse maculopapular rash, 11 /11/17)


Uncoded Allergies:  


     Baby oil (Allergy, Severe, Itching, 11/10/17)





Objective





Last 24 Hour Vital Signs








  Date Time  Temp Pulse Resp B/P (MAP) Pulse Ox O2 Delivery O2 Flow Rate FiO2


 


11/13/17 08:00 98.0 112 20 129/60 93 Room Air  


 


11/13/17 08:00  110      


 


11/13/17 07:00  109 20   Room Air  


 


11/13/17 04:00 98.4 112 25 109/46 95 Room Air  


 


11/13/17 03:41  104      


 


11/13/17 00:00 97.7 112 17 111/48 96 Room Air  


 


11/13/17 00:00  109      


 


11/12/17 20:17  115 20   Room Air  


 


11/12/17 20:00  118      


 


11/12/17 20:00 96.5 112 17 110/50 97 Room Air  


 


11/12/17 16:00  109      


 


11/12/17 16:00 97.5 113 16 123/61 94 Room Air  


 


11/12/17 12:00 97.2 103 18 123/54 97 Room Air  


 


11/12/17 12:00  111      

















Intake and Output  


 


 11/13/17 11/14/17





 19:00 07:00


 


  


 


# Bowel Movements 1 








General Appearance:  WD/WN, no acute distress


HEENT:  normocephalic


Respiratory/Chest:  chest wall non-tender, normal breath sounds


Cardiovascular:  normal peripheral pulses, normal rate


Abdomen:  normal bowel sounds, no organomegaly





Microbiology








 Date/Time


Source Procedure


Growth Status


 


 


 11/10/17 15:25


Blood Blood Culture - Preliminary


NO GROWTH AFTER 48 HOURS Resulted


 


 11/10/17 15:15


Blood Blood Culture - Preliminary


NO GROWTH AFTER 48 HOURS Resulted


 


 11/11/17 03:00


Nasal Nares MRSA Culture - Final


NO METHICILLIN RESISTANT STAPH AUREUS... Complete


 


 11/11/17 03:00


Rectum VRE Culture - Final


NO VANCOMYCIN RESISTANT ENTEROCOCCUS ... Complete








Laboratory Tests


11/12/17 15:30: Gentamicin Level Trough 0.7


11/12/17 17:00: Gentamicin Level Peak 2.1L


11/12/17 20:15: Stool Occult Blood Negative


11/13/17 06:00: 


White Blood Count 3.0L, Red Blood Count 2.95L, Hemoglobin 8.5L, Hematocrit 26.6L

, Mean Corpuscular Volume 90, Mean Corpuscular Hemoglobin 28.9, Mean 

Corpuscular Hemoglobin Concent 32.0, Red Cell Distribution Width 15.0H, 

Platelet Count 190, Mean Platelet Volume 6.7, Neutrophils (%) (Auto) , 

Lymphocytes (%) (Auto) , Monocytes (%) (Auto) , Eosinophils (%) (Auto) , 

Basophils (%) (Auto) , Differential Total Cells Counted 100, Neutrophils % (

Manual) 80H, Lymphocytes % (Manual) 13L, Monocytes % (Manual) 7, Eosinophils % (

Manual) 0, Basophils % (Manual) 0, Band Neutrophils 0, Platelet Estimate 

Adequate, Platelet Morphology Normal, Hypochromasia 1+, Anisocytosis 1+, 

Erythrocyte Sedimentation Rate 119H, Reticulocyte Count 0.9, Prothrombin Time 

11.3, Prothromb Time International Ratio 1.1, Activated Partial Thromboplast 

Time 29, Sodium Level 138, Potassium Level 4.0, Chloride Level 107, Carbon 

Dioxide Level 23, Anion Gap 8, Blood Urea Nitrogen 16, Creatinine 0.8, Estimat 

Glomerular Filtration Rate > 60, Glucose Level 149H, Calcium Level 8.8, Iron 

Level 17L, Total Iron Binding Capacity 287, Percent Iron Saturation 6L, 

Unsaturated Iron Binding 270, Total Bilirubin 0.2, Aspartate Amino Transf (AST/

SGOT) 23, Alanine Aminotransferase (ALT/SGPT) 18, Alkaline Phosphatase 98, 

Lactate Dehydrogenase 179, Troponin I 0.233H, Pro-B-Type Natriuretic Peptide 

2905H, Total Protein 7.4, Albumin 2.7L, Globulin 4.7, Albumin/Globulin Ratio 

0.6L, Vitamin B12 Level 257, Folate 11.1





Current Medications








 Medications


  (Trade)  Dose


 Ordered  Sig/Lakshmi


 Route


 PRN Reason  Start Time


 Stop Time Status Last Admin


Dose Admin


 


 Acetaminophen


  (Tylenol)  650 mg  Q4H  PRN


 ORAL


 For Pain  11/10/17 21:00


 12/10/17 20:59   


 


 


 Acetaminophen/


 Hydrocodone Bitart


  (Norco 5/325)  1 tab  Q6H  PRN


 ORAL


 For Moderate to Severe Pain  11/10/17 21:00


 11/17/17 20:59   


 


 


 Albuterol Sulfate


  (Proventil)  2.5 mg  Q4H  PRN


 HHN


 Shortness of Breath  11/10/17 21:00


 11/15/17 20:59   


 


 


 Aspirin


  (Ecotrin)  81 mg  DAILY


 ORAL


   11/11/17 09:00


 12/11/17 08:59  11/13/17 09:29


 


 


 Atorvastatin


 Calcium


  (Lipitor)  40 mg  BEDTIME


 ORAL


   11/10/17 22:30


 12/10/17 22:29  11/12/17 20:53


 


 


 Chlorhexidine


 Gluconate


  (Althea-Hex 2%)  1 applic  DAILY@2000


 TOPIC


   11/11/17 20:00


 12/11/17 19:59  11/12/17 20:19


 


 


 Clobetasol


 Propionate


  (Temovate)  1 applic  Q12HR


 TOPIC


   11/12/17 21:00


 12/12/17 20:59  11/13/17 09:31


 


 


 Diphenhydramine


 HCl


  (Benadryl)  25 mg  Q6H  PRN


 IVP


 Itching  11/10/17 22:00


 12/10/17 21:59  11/12/17 16:37


 


 


 Docusate Sodium


  (Colace)  100 mg  TWICE A  DAY


 ORAL


   11/11/17 09:00


 12/11/17 08:59  11/13/17 09:31


 


 


 Doxycycline


 Monohydrate


  (Vibramycin)  100 mg  EVERY 12  HOURS


 ORAL


   11/10/17 22:30


 11/17/17 22:29  11/13/17 09:30


 


 


 Enoxaparin Sodium


  (Lovenox)  40 mg  DAILY


 SUBQ


   11/11/17 13:00


 12/11/17 12:59  11/13/17 09:30


 


 


 Fluoxetine HCl


  (PROzac)  20 mg  DAILY


 ORAL


   11/11/17 09:00


 12/11/17 08:59  11/13/17 09:31


 


 


 Gentamicin


 Protocol


  (Gentamicin


 pharmacy to dose)  1 ea  DAILY  PRN


 MISC


 Per rx protocol  11/11/17 13:00


 11/19/17 23:55   


 


 


 Gentamicin


 Sulfate 60 mg/


 Sodium Chloride  56.5 ml @ 


 110 mls/hr  Q12H


 IVPB


   11/11/17 16:00


 11/18/17 15:59  11/13/17 04:13


 


 


 Metformin HCl


  (Glucophage)  850 mg  BID


 ORAL


   11/11/17 09:00


 12/11/17 08:59  11/11/17 18:05


 


 


 Olanzapine


  (ZyPREXA)  5 mg  BEDTIME


 ORAL


   11/10/17 22:30


 12/10/17 22:29  11/12/17 20:53


 


 


 Vancomycin HCl


  (Vanco rx to


 dose)  1 ea  DAILY  PRN


 MISC


 Per rx protocol  11/11/17 13:00


 12/11/17 12:59   


 


 


 Vancomycin/Sodium


 Chloride  250 ml @ 


 166.667


 mls/hr  Q12H


 IVPB


   11/12/17 02:00


 11/17/17 01:59  11/13/17 02:22


 

















AP LINDO Nov 13, 2017 11:38

## 2017-11-13 NOTE — CARDIOLOGY PROGRESS NOTE
Assessment/Plan


Assessment/Plan


1. Sinus tachycardia, probably heart failure due to valvular CM, ? acute AR,  

KITTY for Wednesday at 7:30 am.


2. Possible eccentric AR, cannot assess severity by TTE, will delineate more 

with KITTY.


3. Aortic valve endocarditis on IV antibiotics.


4. Bilateral pleural effusion


5. Possible syncopal event, normal EF, PE ruled out.





Subjective


Subjective


Remains to be holding sinus tachycardia at ~110.


Denies SOB!





Objective





Last 24 Hour Vital Signs








  Date Time  Temp Pulse Resp B/P (MAP) Pulse Ox O2 Delivery O2 Flow Rate FiO2


 


11/13/17 20:35 97.5 109 18 115/55 98 Room Air  


 


11/13/17 19:30  110 20   Room Air  21


 


11/13/17 16:00 98.6 109 21 110/50 93 Nasal Cannula 2.0 


 


11/13/17 16:00  109      


 


11/13/17 12:00 97.9 108 20 127/63 93 Room Air  


 


11/13/17 12:00  114      


 


11/13/17 08:00 98.0 112 20 129/60 93 Room Air  


 


11/13/17 08:00  110      


 


11/13/17 07:00  109 20   Room Air  


 


11/13/17 04:00 98.4 112 25 109/46 95 Room Air  


 


11/13/17 03:41  104      


 


11/13/17 00:00 97.7 112 17 111/48 96 Room Air  


 


11/13/17 00:00  109      

















Intake and Output  


 


 11/13/17 11/14/17





 19:00 07:00


 


Intake Total 869.834 ml 


 


Balance 869.834 ml 


 


  


 


Intake Oral 480 ml 


 


IV Total 389.834 ml 


 


# Bowel Movements 1 











Laboratory Tests








Test


  11/13/17


06:00 11/13/17


13:05


 


White Blood Count


  3.0 K/UL


(4.8-10.8)  L 


 


 


Red Blood Count


  2.95 M/UL


(4.20-5.40)  L 


 


 


Hemoglobin


  8.5 G/DL


(12.0-16.0)  L 


 


 


Hematocrit


  26.6 %


(37.0-47.0)  L 


 


 


Mean Corpuscular Volume 90 FL (80-99)   


 


Mean Corpuscular Hemoglobin


  28.9 PG


(27.0-31.0) 


 


 


Mean Corpuscular Hemoglobin


Concent 32.0 G/DL


(32.0-36.0) 


 


 


Red Cell Distribution Width


  15.0 %


(11.6-14.8)  H 


 


 


Platelet Count


  190 K/UL


(150-450) 


 


 


Mean Platelet Volume


  6.7 FL


(6.5-10.1) 


 


 


Neutrophils (%) (Auto)


  % (45.0-75.0)


  


 


 


Lymphocytes (%) (Auto)


  % (20.0-45.0)


  


 


 


Monocytes (%) (Auto)  % (1.0-10.0)   


 


Eosinophils (%) (Auto)  % (0.0-3.0)   


 


Basophils (%) (Auto)  % (0.0-2.0)   


 


Differential Total Cells


Counted 100  


  


 


 


Neutrophils % (Manual) 80 % (45-75)  H 


 


Lymphocytes % (Manual) 13 % (20-45)  L 


 


Monocytes % (Manual) 7 % (1-10)   


 


Eosinophils % (Manual) 0 % (0-3)   


 


Basophils % (Manual) 0 % (0-2)   


 


Band Neutrophils 0 % (0-8)   


 


Platelet Estimate Adequate   


 


Platelet Morphology Normal   


 


Hypochromasia 1+   


 


Anisocytosis 1+   


 


Erythrocyte Sedimentation Rate


  119 MM/HR


(0-30)  H 


 


 


Reticulocyte Count


  0.9 %


(0.0-2.0) 


 


 


Prothrombin Time


  11.3 SEC


(9.30-11.50) 


 


 


Prothromb Time International


Ratio 1.1 (0.9-1.1)  


  


 


 


Activated Partial


Thromboplast Time 29 SEC (23-33)


  


 


 


Sodium Level


  138 MMOL/L


(136-145) 


 


 


Potassium Level


  4.0 MMOL/L


(3.5-5.1) 


 


 


Chloride Level


  107 MMOL/L


() 


 


 


Carbon Dioxide Level


  23 MMOL/L


(21-32) 


 


 


Anion Gap


  8 mmol/L


(5-15) 


 


 


Blood Urea Nitrogen


  16 mg/dL


(7-18) 


 


 


Creatinine


  0.8 MG/DL


(0.55-1.30) 


 


 


Estimat Glomerular Filtration


Rate > 60 mL/min


(>60) 


 


 


Glucose Level


  149 MG/DL


()  H 


 


 


Calcium Level


  8.8 MG/DL


(8.5-10.1) 


 


 


Iron Level


  17 ug/dL


()  L 


 


 


Total Iron Binding Capacity


  287 ug/dL


(250-450) 


 


 


Percent Iron Saturation 6 % (15-50)  L 


 


Unsaturated Iron Binding


  270 ug/dL


(112-346) 


 


 


Total Bilirubin


  0.2 MG/DL


(0.2-1.0) 


 


 


Aspartate Amino Transf


(AST/SGOT) 23 U/L (15-37)


  


 


 


Alanine Aminotransferase


(ALT/SGPT) 18 U/L (12-78)


  


 


 


Alkaline Phosphatase


  98 U/L


() 


 


 


Lactate Dehydrogenase


  179 U/L


() 


 


 


Troponin I


  0.233 ng/mL


(0.000-0.056) 


 


 


Pro-B-Type Natriuretic Peptide


  2905 pg/mL


(0-125)  H 


 


 


Total Protein


  7.4 G/DL


(6.4-8.2) 


 


 


Albumin


  2.7 G/DL


(3.4-5.0)  L 


 


 


Globulin 4.7 g/dL   


 


Albumin/Globulin Ratio


  0.6 (1.0-2.7)


L 


 


 


Vitamin B12 Level


  257 PG/ML


(193-986) 


 


 


Folate


  11.1 NG/ML


(3.1-17.5) 


 


 


Vancomycin Level Trough


  


  9.2 ug/mL


(5.0-12.0)











Microbiology








 Date/Time


Source Procedure


Growth Status


 


 


 11/11/17 03:00


Nasal Nares MRSA Culture - Final


NO METHICILLIN RESISTANT STAPH AUREUS... Complete


 


 11/11/17 03:00


Rectum VRE Culture - Final


NO VANCOMYCIN RESISTANT ENTEROCOCCUS ... Complete








Objective


GENERAL:  The patient is a very pleasant 67-year-old female, in no apparent


respiratory distress, alert and oriented x4.


HEENT:  Atraumatic and normocephalic.  ENT, pupils are equal, round, and


reactive to light and accommodation.  Extraocular muscles are intact.


NECK:  JVP less than 5 cm.  No carotid bruits.  Carotid upstrokes 2+


bilaterally.


CARDIOVASCULAR:  Normal S1 and S2.  Regular rate and rhythm. Tachycardic. 

Summation gallops


with 2/6 MSM at LSB. PMI is at fourth intercostal space in the midclavicular


line.


LUNGS:  Clear to auscultation bilaterally.


ABDOMEN:  Soft, nontender, and nondistended.  No hepatosplenomegaly.


Positive bowel sounds.


EXTREMITIES:  No evidence of edema, clubbing, or cyanosis.











SAMSON LAWTON Nov 13, 2017 23:15

## 2017-11-13 NOTE — GENERAL PROGRESS NOTE
Assessment/Plan


Status:  stable


Assessment/Plan


1. Acute encephlopathy


2. Sinus Tachy cardia


3. Pleural effusion


4. Pericardial effusion


5. Endocarditis.


6. Iatrogenic skin rashes


4. Diabetes type 2.


5. Hypertension.


6. Hypercholesterolemia.


7. Asthma.


8. Osteoarthritis of the bilateral knees.


9. Chronic low back pain.


10. GI-DVT prophylaxia





Plan:


will proceed with KITTY


nohelia-Valvular complications merits additional w/u





Subjective


ROS Limited/Unobtainable:  No


Constitutional:  Reports: no symptoms


HEENT:  Reports: no symptoms


Cardiovascular:  Reports: no symptoms


Respiratory:  Reports: no symptoms


Allergies:  


Coded Allergies:  


     CEPHALEXIN (Verified  Allergy, Intermediate, diffuse maculopapular rash, 11 /11/17)


Uncoded Allergies:  


     Baby oil (Allergy, Severe, Itching, 11/10/17)





Objective





Last 24 Hour Vital Signs








  Date Time  Temp Pulse Resp B/P (MAP) Pulse Ox O2 Delivery O2 Flow Rate FiO2


 


11/13/17 08:00 98.0 112 20 129/60 93 Room Air  


 


11/13/17 08:00  110      


 


11/13/17 07:00  109 20   Room Air  


 


11/13/17 04:00 98.4 112 25 109/46 95 Room Air  


 


11/13/17 03:41  104      


 


11/13/17 00:00 97.7 112 17 111/48 96 Room Air  


 


11/13/17 00:00  109      


 


11/12/17 20:17  115 20   Room Air  


 


11/12/17 20:00  118      


 


11/12/17 20:00 96.5 112 17 110/50 97 Room Air  


 


11/12/17 16:00  109      


 


11/12/17 16:00 97.5 113 16 123/61 94 Room Air  

















Intake and Output  


 


 11/13/17 11/14/17





 19:00 07:00


 


  


 


# Bowel Movements 1 








Laboratory Tests


11/12/17 15:30: Gentamicin Level Trough 0.7


11/12/17 17:00: Gentamicin Level Peak 2.1L


11/12/17 20:15: Stool Occult Blood Negative


11/13/17 06:00: 


White Blood Count 3.0L, Red Blood Count 2.95L, Hemoglobin 8.5L, Hematocrit 26.6L

, Mean Corpuscular Volume 90, Mean Corpuscular Hemoglobin 28.9, Mean 

Corpuscular Hemoglobin Concent 32.0, Red Cell Distribution Width 15.0H, 

Platelet Count 190, Mean Platelet Volume 6.7, Neutrophils (%) (Auto) , 

Lymphocytes (%) (Auto) , Monocytes (%) (Auto) , Eosinophils (%) (Auto) , 

Basophils (%) (Auto) , Differential Total Cells Counted 100, Neutrophils % (

Manual) 80H, Lymphocytes % (Manual) 13L, Monocytes % (Manual) 7, Eosinophils % (

Manual) 0, Basophils % (Manual) 0, Band Neutrophils 0, Platelet Estimate 

Adequate, Platelet Morphology Normal, Hypochromasia 1+, Anisocytosis 1+, 

Erythrocyte Sedimentation Rate 119H, Reticulocyte Count 0.9, Prothrombin Time 

11.3, Prothromb Time International Ratio 1.1, Activated Partial Thromboplast 

Time 29, Sodium Level 138, Potassium Level 4.0, Chloride Level 107, Carbon 

Dioxide Level 23, Anion Gap 8, Blood Urea Nitrogen 16, Creatinine 0.8, Estimat 

Glomerular Filtration Rate > 60, Glucose Level 149H, Calcium Level 8.8, Iron 

Level 17L, Total Iron Binding Capacity 287, Percent Iron Saturation 6L, 

Unsaturated Iron Binding 270, Total Bilirubin 0.2, Aspartate Amino Transf (AST/

SGOT) 23, Alanine Aminotransferase (ALT/SGPT) 18, Alkaline Phosphatase 98, 

Lactate Dehydrogenase 179, Troponin I 0.233H, Pro-B-Type Natriuretic Peptide 

2905H, Total Protein 7.4, Albumin 2.7L, Globulin 4.7, Albumin/Globulin Ratio 

0.6L, Vitamin B12 Level 257, Folate 11.1


11/13/17 13:05: Vancomycin Level Trough 9.2


Height (Feet):  5


Height (Inches):  4.00


Weight (Pounds):  140


General Appearance:  WD/WN


EENT:  PERRL/EOMI


Neck:  non-tender


Cardiovascular:  systolic murmur


Respiratory/Chest:  lungs clear


Abdomen:  soft


Extremities:  non-tender


Neurologic:  CNs II-XII grossly normal











Clayton Moon MD Nov 13, 2017 15:01

## 2017-11-13 NOTE — DIAGNOSTIC IMAGING REPORT
Indications: Needs long-term IV access



Technique: Ultrasound confirms patent compressible left basilic vein. Total 

sterile

technique, including  sterile probe cover and sterile gel, hat, mask,, sterile 

gown,

large sterile drape, and preparation with 2% chlorhexidine utilized. Local

anesthesia with 1% lidocaine. Under real-time ultrasound guidance, puncture basilic

vein using 21-gauge needle, documented and archived, passage 0.018 guidewire under

direct fluoroscopy, which was used to determine appropriate catheter length,

exchange for 5 Sami peel-away sheath. 5 Sami Bard  dual-lumen power PICC cut to

45 cm. It was inserted through the peel-away sheath. Peel-away sheath and 

guidewire

removed. Catheter fixed to the skin. Both catheter ports aspirated and flushed.

Patient tolerated procedure well, without immediate complication. Digital 

radiograph

documents satisfactory catheter tip position, at the cavoatrial junction.



Total fluoroscopy time 0.8 minutes.

Total dose area product  24 dGycm2





Impression: Successful placement of     PICC under sonographic and fluoroscopic

guidance, as described above.

## 2017-11-13 NOTE — INFECTIOUS DISEASES PROG NOTE
Assessment/Plan


Assessment/Plan








Assesment:








-Syncopal episode- r/o anatomic obstruction due to vegetations vs conduction 

abnormalities vs other


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


-Afebrile, no leukocytosis


- 2D Echo : Aortic Veg +











HLD


OA


spinal stenosis


DM2


Bipolar disorder


Spondylolisthesis


Asthma  





Plan:





-Will continue her already established treatment for endocarditis with  

Doxycycline (untill 2/5/18) ,Gentamycin 60mg IV bid (untill 11/19/11), IV 

Vancomycin (until 12/12/17)


  -monitor platelets, Cr


  -typically bartonella endocarditis consist of Doxy for 6-12  weeks and Genta 

for 2 weeks; however patient has been managed by another ID physician as an 

outpatient E


-2 sets pf Bcx


-bartonella serologies


-ESR/CRP am (for baseine)


-f/u cx


-Monitor CBC/BMP, temperatures





Subjective


Constitutional:  Denies: no symptoms, fever, chills, fatigue, anorexia, 

drenching sweats, other


Allergies:  


Coded Allergies:  


     CEPHALEXIN (Verified  Allergy, Intermediate, diffuse maculopapular rash, 11 /11/17)


Uncoded Allergies:  


     Baby oil (Allergy, Severe, Itching, 11/10/17)


Subjective


pt is gone for PICC placement





Objective


Vital Signs





Last 24 Hour Vital Signs








  Date Time  Temp Pulse Resp B/P (MAP) Pulse Ox O2 Delivery O2 Flow Rate FiO2


 


11/13/17 08:00 98.0 112 20 129/60 93 Room Air  


 


11/13/17 07:00  109 20   Room Air  


 


11/13/17 04:00 98.4 112 25 109/46 95 Room Air  


 


11/13/17 03:41  104      


 


11/13/17 00:00 97.7 112 17 111/48 96 Room Air  


 


11/13/17 00:00  109      


 


11/12/17 20:17  115 20   Room Air  


 


11/12/17 20:00  118      


 


11/12/17 20:00 96.5 112 17 110/50 97 Room Air  


 


11/12/17 16:00  109      


 


11/12/17 16:00 97.5 113 16 123/61 94 Room Air  


 


11/12/17 12:00 97.2 103 18 123/54 97 Room Air  


 


11/12/17 12:00  111      








Height (Feet):  5


Height (Inches):  4.00


Weight (Pounds):  140


HEENT:  anicteric


Respiratory/Chest:  normal breath sounds


Cardiovascular:  no gallop/murmur


Abdomen:  non distended





Microbiology








 Date/Time


Source Procedure


Growth Status


 


 


 11/10/17 15:25


Blood Blood Culture - Preliminary


NO GROWTH AFTER 48 HOURS Resulted


 


 11/10/17 15:15


Blood Blood Culture - Preliminary


NO GROWTH AFTER 48 HOURS Resulted


 


 11/11/17 03:00


Nasal Nares MRSA Culture - Final


NO METHICILLIN RESISTANT STAPH AUREUS... Complete


 


 11/11/17 03:00


Rectum VRE Culture - Final


NO VANCOMYCIN RESISTANT ENTEROCOCCUS ... Complete











Laboratory Tests








Test


  11/12/17


15:30 11/12/17


17:00 11/12/17


20:15 11/13/17


06:00


 


Gentamicin Level Trough


  0.7 ug/mL


(0.3-2.0) 


  


  


 


 


Gentamicin Level Peak


  


  2.1 ug/mL


(4.0-10.0)  L 


  


 


 


Stool Occult Blood


  


  


  Negative


(NEGATIVE) 


 


 


White Blood Count


  


  


  


  3.0 K/UL


(4.8-10.8)  L


 


Red Blood Count


  


  


  


  2.95 M/UL


(4.20-5.40)  L


 


Hemoglobin


  


  


  


  8.5 G/DL


(12.0-16.0)  L


 


Hematocrit


  


  


  


  26.6 %


(37.0-47.0)  L


 


Mean Corpuscular Volume    90 FL (80-99)  


 


Mean Corpuscular Hemoglobin


  


  


  


  28.9 PG


(27.0-31.0)


 


Mean Corpuscular Hemoglobin


Concent 


  


  


  32.0 G/DL


(32.0-36.0)


 


Red Cell Distribution Width


  


  


  


  15.0 %


(11.6-14.8)  H


 


Platelet Count


  


  


  


  190 K/UL


(150-450)


 


Mean Platelet Volume


  


  


  


  6.7 FL


(6.5-10.1)


 


Neutrophils (%) (Auto)


  


  


  


  % (45.0-75.0)


 


 


Lymphocytes (%) (Auto)


  


  


  


  % (20.0-45.0)


 


 


Monocytes (%) (Auto)     % (1.0-10.0)  


 


Eosinophils (%) (Auto)     % (0.0-3.0)  


 


Basophils (%) (Auto)     % (0.0-2.0)  


 


Differential Total Cells


Counted 


  


  


  100  


 


 


Neutrophils % (Manual)    80 % (45-75)  H


 


Lymphocytes % (Manual)    13 % (20-45)  L


 


Monocytes % (Manual)    7 % (1-10)  


 


Eosinophils % (Manual)    0 % (0-3)  


 


Basophils % (Manual)    0 % (0-2)  


 


Band Neutrophils    0 % (0-8)  


 


Platelet Estimate    Adequate  


 


Platelet Morphology    Normal  


 


Hypochromasia    1+  


 


Anisocytosis    1+  


 


Erythrocyte Sedimentation Rate


  


  


  


  119 MM/HR


(0-30)  H


 


Reticulocyte Count


  


  


  


  0.9 %


(0.0-2.0)


 


Prothrombin Time


  


  


  


  11.3 SEC


(9.30-11.50)


 


Prothromb Time International


Ratio 


  


  


  1.1 (0.9-1.1)  


 


 


Activated Partial


Thromboplast Time 


  


  


  29 SEC (23-33)


 


 


Sodium Level


  


  


  


  138 MMOL/L


(136-145)


 


Potassium Level


  


  


  


  4.0 MMOL/L


(3.5-5.1)


 


Chloride Level


  


  


  


  107 MMOL/L


()


 


Carbon Dioxide Level


  


  


  


  23 MMOL/L


(21-32)


 


Anion Gap


  


  


  


  8 mmol/L


(5-15)


 


Blood Urea Nitrogen


  


  


  


  16 mg/dL


(7-18)


 


Creatinine


  


  


  


  0.8 MG/DL


(0.55-1.30)


 


Estimat Glomerular Filtration


Rate 


  


  


  > 60 mL/min


(>60)


 


Glucose Level


  


  


  


  149 MG/DL


()  H


 


Calcium Level


  


  


  


  8.8 MG/DL


(8.5-10.1)


 


Iron Level


  


  


  


  17 ug/dL


()  L


 


Total Iron Binding Capacity


  


  


  


  287 ug/dL


(250-450)


 


Percent Iron Saturation    6 % (15-50)  L


 


Unsaturated Iron Binding


  


  


  


  270 ug/dL


(112-346)


 


Total Bilirubin


  


  


  


  0.2 MG/DL


(0.2-1.0)


 


Aspartate Amino Transf


(AST/SGOT) 


  


  


  23 U/L (15-37)


 


 


Alanine Aminotransferase


(ALT/SGPT) 


  


  


  18 U/L (12-78)


 


 


Alkaline Phosphatase


  


  


  


  98 U/L


()


 


Lactate Dehydrogenase


  


  


  


  179 U/L


()


 


Troponin I


  


  


  


  0.233 ng/mL


(0.000-0.056)


 


Pro-B-Type Natriuretic Peptide


  


  


  


  2905 pg/mL


(0-125)  H


 


Total Protein


  


  


  


  7.4 G/DL


(6.4-8.2)


 


Albumin


  


  


  


  2.7 G/DL


(3.4-5.0)  L


 


Globulin    4.7 g/dL  


 


Albumin/Globulin Ratio


  


  


  


  0.6 (1.0-2.7)


L


 


Vitamin B12 Level


  


  


  


  257 PG/ML


(193-986)


 


Folate


  


  


  


  11.1 NG/ML


(3.1-17.5)











Current Medications








 Medications


  (Trade)  Dose


 Ordered  Sig/Lakshmi


 Route


 PRN Reason  Start Time


 Stop Time Status Last Admin


Dose Admin


 


 Acetaminophen


  (Tylenol)  650 mg  Q4H  PRN


 ORAL


 For Pain  11/10/17 21:00


 12/10/17 20:59   


 


 


 Acetaminophen/


 Hydrocodone Bitart


  (Norco 5/325)  1 tab  Q6H  PRN


 ORAL


 For Moderate to Severe Pain  11/10/17 21:00


 11/17/17 20:59   


 


 


 Albuterol Sulfate


  (Proventil)  2.5 mg  Q4H  PRN


 HHN


 Shortness of Breath  11/10/17 21:00


 11/15/17 20:59   


 


 


 Aspirin


  (Ecotrin)  81 mg  DAILY


 ORAL


   11/11/17 09:00


 12/11/17 08:59  11/13/17 09:29


 


 


 Atorvastatin


 Calcium


  (Lipitor)  40 mg  BEDTIME


 ORAL


   11/10/17 22:30


 12/10/17 22:29  11/12/17 20:53


 


 


 Chlorhexidine


 Gluconate


  (Althea-Hex 2%)  1 applic  DAILY@2000


 TOPIC


   11/11/17 20:00


 12/11/17 19:59  11/12/17 20:19


 


 


 Clobetasol


 Propionate


  (Temovate)  1 applic  Q12HR


 TOPIC


   11/12/17 21:00


 12/12/17 20:59  11/13/17 09:31


 


 


 Diphenhydramine


 HCl


  (Benadryl)  25 mg  Q6H  PRN


 IVP


 Itching  11/10/17 22:00


 12/10/17 21:59  11/12/17 16:37


 


 


 Docusate Sodium


  (Colace)  100 mg  TWICE A  DAY


 ORAL


   11/11/17 09:00


 12/11/17 08:59  11/13/17 09:31


 


 


 Doxycycline


 Monohydrate


  (Vibramycin)  100 mg  EVERY 12  HOURS


 ORAL


   11/10/17 22:30


 11/17/17 22:29  11/13/17 09:30


 


 


 Enoxaparin Sodium


  (Lovenox)  40 mg  DAILY


 SUBQ


   11/11/17 13:00


 12/11/17 12:59  11/13/17 09:30


 


 


 Fluoxetine HCl


  (PROzac)  20 mg  DAILY


 ORAL


   11/11/17 09:00


 12/11/17 08:59  11/13/17 09:31


 


 


 Gentamicin


 Protocol


  (Gentamicin


 pharmacy to dose)  1 ea  DAILY  PRN


 MISC


 Per rx protocol  11/11/17 13:00


 11/19/17 23:55   


 


 


 Gentamicin


 Sulfate 60 mg/


 Sodium Chloride  56.5 ml @ 


 110 mls/hr  Q12H


 IVPB


   11/11/17 16:00


 11/18/17 15:59  11/13/17 04:13


 


 


 Metformin HCl


  (Glucophage)  850 mg  BID


 ORAL


   11/11/17 09:00


 12/11/17 08:59  11/11/17 18:05


 


 


 Olanzapine


  (ZyPREXA)  5 mg  BEDTIME


 ORAL


   11/10/17 22:30


 12/10/17 22:29  11/12/17 20:53


 


 


 Vancomycin HCl


  (Vanco rx to


 dose)  1 ea  DAILY  PRN


 MISC


 Per rx protocol  11/11/17 13:00


 12/11/17 12:59   


 


 


 Vancomycin/Sodium


 Chloride  250 ml @ 


 166.667


 mls/hr  Q12H


 IVPB


   11/12/17 02:00


 11/17/17 01:59  11/13/17 02:22


 

















AKSHAT DELACRUZ M.D. Nov 13, 2017 11:32

## 2017-11-13 NOTE — DIAGNOSTIC IMAGING REPORT
--------------- APPROVED REPORT --------------





CPT Code: 87345



Vascular Symptoms

Dizziness and Vertigo





CAROTID (BILATERAL) - Imaging reveals no significant plaque within the right and left 

extracranial carotid arteries. The Doppler spectral flow analysis is within normal limits 

throughout the extracranial carotid arteries bilaterally.  VERTEBRAL- The vertebral 

arteries are within normal limits.

## 2017-11-14 VITALS — SYSTOLIC BLOOD PRESSURE: 121 MMHG | DIASTOLIC BLOOD PRESSURE: 55 MMHG

## 2017-11-14 VITALS — SYSTOLIC BLOOD PRESSURE: 110 MMHG | DIASTOLIC BLOOD PRESSURE: 56 MMHG

## 2017-11-14 VITALS — DIASTOLIC BLOOD PRESSURE: 59 MMHG | SYSTOLIC BLOOD PRESSURE: 133 MMHG

## 2017-11-14 VITALS — DIASTOLIC BLOOD PRESSURE: 65 MMHG | SYSTOLIC BLOOD PRESSURE: 150 MMHG

## 2017-11-14 VITALS — SYSTOLIC BLOOD PRESSURE: 127 MMHG | DIASTOLIC BLOOD PRESSURE: 57 MMHG

## 2017-11-14 VITALS — SYSTOLIC BLOOD PRESSURE: 119 MMHG | DIASTOLIC BLOOD PRESSURE: 52 MMHG

## 2017-11-14 LAB
ALBUMIN/GLOB SERPL: 0.5 {RATIO} (ref 1–2.7)
ALT SERPL-CCNC: 17 U/L (ref 12–78)
ANION GAP SERPL CALC-SCNC: 10 MMOL/L (ref 5–15)
AST SERPL-CCNC: 23 U/L (ref 15–37)
CALCIUM SERPL-MCNC: 8.2 MG/DL (ref 8.5–10.1)
CHLORIDE SERPL-SCNC: 110 MMOL/L (ref 98–107)
CO2 SERPL-SCNC: 19 MMOL/L (ref 21–32)
CREAT SERPL-MCNC: 0.6 MG/DL (ref 0.55–1.3)
GFR SERPLBLD BASED ON 1.73 SQ M-ARVRAT: > 60 ML/MIN (ref 60–?)
GLOBULIN SER-MCNC: 4.5 G/DL
OTHERS: (no result)
POTASSIUM SERPL-SCNC: 3.6 MMOL/L (ref 3.5–5.1)
PROT SERPL-MCNC: 6.9 G/DL (ref 6.4–8.2)
SODIUM SERPL-SCNC: 139 MMOL/L (ref 136–145)

## 2017-11-14 RX ADMIN — CLOBETASOL PROPIONATE SCH APPLIC: 0.5 OINTMENT TOPICAL at 09:00

## 2017-11-14 RX ADMIN — SODIUM CHLORIDE SCH MLS/HR: 9 INJECTION, SOLUTION INTRAVENOUS at 23:01

## 2017-11-14 RX ADMIN — ATORVASTATIN CALCIUM SCH MG: 20 TABLET, FILM COATED ORAL at 21:06

## 2017-11-14 RX ADMIN — ASPIRIN SCH MG: 81 TABLET, DELAYED RELEASE ORAL at 08:33

## 2017-11-14 RX ADMIN — SODIUM CHLORIDE SCH MLS/HR: 0.9 INJECTION INTRAVENOUS at 21:06

## 2017-11-14 RX ADMIN — CHLORHEXIDINE GLUCONATE SCH APPLIC: 213 SOLUTION TOPICAL at 19:55

## 2017-11-14 RX ADMIN — DOCUSATE SODIUM SCH MG: 100 CAPSULE, LIQUID FILLED ORAL at 18:14

## 2017-11-14 RX ADMIN — DOCUSATE SODIUM SCH MG: 100 CAPSULE, LIQUID FILLED ORAL at 08:33

## 2017-11-14 RX ADMIN — Medication SCH MLS/HR: at 05:41

## 2017-11-14 RX ADMIN — SODIUM CHLORIDE SCH MLS/HR: 0.9 INJECTION INTRAVENOUS at 03:52

## 2017-11-14 RX ADMIN — DOCUSATE SODIUM SCH MG: 100 CAPSULE, LIQUID FILLED ORAL at 08:42

## 2017-11-14 RX ADMIN — ENOXAPARIN SODIUM SCH MG: 40 INJECTION SUBCUTANEOUS at 08:38

## 2017-11-14 RX ADMIN — DOCUSATE SODIUM SCH MG: 100 CAPSULE, LIQUID FILLED ORAL at 18:00

## 2017-11-14 RX ADMIN — CLOBETASOL PROPIONATE SCH APPLIC: 0.5 OINTMENT TOPICAL at 21:07

## 2017-11-14 NOTE — CARDIOLOGY PROGRESS NOTE
Assessment/Plan


Assessment/Plan


1. Sinus tachycardia, probably heart failure due to valvular CM, ? acute AR,  

KITTY scheduled for am.


2. Possible eccentric AR, cannot assess severity by TTE, will delineate more 

with KITTY.


3. Aortic valve endocarditis on IV antibiotics.


4. Bilateral pleural effusion


5. Possible syncopal event, normal EF, PE ruled out.





Subjective


Subjective


Sinus rhythm at 99.





Objective





Last 24 Hour Vital Signs








  Date Time  Temp Pulse Resp B/P (MAP) Pulse Ox O2 Delivery O2 Flow Rate FiO2


 


11/14/17 20:42 99.0 99 18 110/56 95 Room Air  


 


11/14/17 19:40  112 18   Room Air  21


 


11/14/17 16:00 97.8 112 21 133/59 96 Room Air  


 


11/14/17 16:00  108      


 


11/14/17 12:00 97.5 80 20 125/65 96 Room Air  


 


11/14/17 12:00  107      


 


11/14/17 08:35  87 20   Room Air  21


 


11/14/17 08:00  110      


 


11/14/17 08:00 97.1 108 20 127/57 97 Room Air  


 


11/14/17 04:49 97.5 94 18 119/52 95 Room Air  


 


11/14/17 04:00  106      


 


11/14/17 00:42 97.5 100 18 121/55 92 Room Air  


 


11/14/17 00:00  106      

















Intake and Output  


 


 11/14/17 11/15/17





 19:00 07:00


 


  


 


# Bowel Movements 2 








2D Echo:  LVEF 50%, Poss eccentric AR ?severity,RVSP 42 mmHg, Pseudo-normal LV 

physio





Laboratory Tests








Test


  11/14/17


06:00 11/14/17


13:25


 


Sodium Level


  139 MMOL/L


(136-145) 


 


 


Potassium Level


  3.6 MMOL/L


(3.5-5.1) 


 


 


Chloride Level


  110 MMOL/L


()  H 


 


 


Carbon Dioxide Level


  19 MMOL/L


(21-32)  L 


 


 


Anion Gap


  10 mmol/L


(5-15) 


 


 


Blood Urea Nitrogen


  15 mg/dL


(7-18) 


 


 


Creatinine


  0.6 MG/DL


(0.55-1.30) 


 


 


Estimat Glomerular Filtration


Rate > 60 mL/min


(>60) 


 


 


Glucose Level


  139 MG/DL


()  H 


 


 


Calcium Level


  8.2 MG/DL


(8.5-10.1)  L 


 


 


Total Bilirubin


  0.3 MG/DL


(0.2-1.0) 


 


 


Aspartate Amino Transf


(AST/SGOT) 23 U/L (15-37)


  


 


 


Alanine Aminotransferase


(ALT/SGPT) 17 U/L (12-78)


  


 


 


Alkaline Phosphatase


  83 U/L


() 


 


 


Total Protein


  6.9 G/DL


(6.4-8.2) 


 


 


Albumin


  2.4 G/DL


(3.4-5.0)  L 


 


 


Globulin 4.5 g/dL   


 


Albumin/Globulin Ratio


  0.5 (1.0-2.7)


L 


 


 


Vancomycin Level Trough


  


  26.5 ug/mL


(5.0-12.0)  H








Objective


GENERAL:  The patient is a very pleasant 67-year-old female, in no apparent


respiratory distress, alert and oriented x4.


HEENT:  Atraumatic and normocephalic.  ENT, pupils are equal, round, and


reactive to light and accommodation.  Extraocular muscles are intact.


NECK:  JVP less than 5 cm.  No carotid bruits.  Carotid upstrokes 2+


bilaterally.


CARDIOVASCULAR:  Normal S1 and S2.  Regular rate and rhythm. Tachycardic. 

Summation gallops


with 2/6 MSM at LSB. PMI is at fourth intercostal space in the midclavicular


line.


LUNGS:  Clear to auscultation bilaterally.


ABDOMEN:  Soft, nontender, and nondistended.  No hepatosplenomegaly.


Positive bowel sounds.


EXTREMITIES:  No evidence of edema, clubbing, or cyanosis.











SAMSON LAWTON Nov 14, 2017 23:08

## 2017-11-14 NOTE — GENERAL PROGRESS NOTE
Assessment/Plan


Status:  stable


Assessment/Plan


1. Acute encephlopathy


2. Sinus Tachy cardia


3. Pleural effusion


4. Pericardial effusion


5. Endocarditis- Aortic Valve -h/o - c/w abx


6. Iatrogenic skin rashes


4. Diabetes type 2.


5. Hypertension.


6. Hypercholesterolemia.


7. Asthma.


8. Osteoarthritis of the bilateral knees.


9. Chronic low back pain.


10. GI-DVT prophylaxia





Plan:


will proceed with KITTY


nohelia-Valvular complications merits additional w/u





Subjective


ROS Limited/Unobtainable:  No


Constitutional:  Reports: malaise


HEENT:  Reports: no symptoms


Cardiovascular:  Reports: no symptoms


Respiratory:  Reports: no symptoms


Allergies:  


Coded Allergies:  


     CEPHALEXIN (Verified  Allergy, Intermediate, diffuse maculopapular rash, 11 /11/17)


Uncoded Allergies:  


     Baby oil (Allergy, Severe, Itching, 11/10/17)


Subjective


persistent Diffuse maculo papular rashes all over





Objective





Last 24 Hour Vital Signs








  Date Time  Temp Pulse Resp B/P (MAP) Pulse Ox O2 Delivery O2 Flow Rate FiO2


 


11/14/17 16:00 97.8 112 21 133/59 96 Room Air  


 


11/14/17 12:00 97.5 80 20 125/65 96 Room Air  


 


11/14/17 12:00  107      


 


11/14/17 08:35  87 20   Room Air  21


 


11/14/17 08:00  110      


 


11/14/17 08:00 97.1 108 20 127/57 97 Room Air  


 


11/14/17 04:49 97.5 94 18 119/52 95 Room Air  


 


11/14/17 04:00  106      


 


11/14/17 00:42 97.5 100 18 121/55 92 Room Air  


 


11/14/17 00:00  106      


 


11/13/17 20:35 97.5 109 18 115/55 98 Room Air  


 


11/13/17 20:00  110      


 


11/13/17 19:30  110 20   Room Air  21

















Intake and Output  


 


 11/14/17 11/15/17





 19:00 07:00


 


  


 


# Bowel Movements 2 








Laboratory Tests


11/14/17 06:00: 


Sodium Level 139, Potassium Level 3.6, Chloride Level 110H, Carbon Dioxide 

Level 19L, Anion Gap 10, Blood Urea Nitrogen 15, Creatinine 0.6, Estimat 

Glomerular Filtration Rate > 60, Glucose Level 139H, Calcium Level 8.2L, Total 

Bilirubin 0.3, Aspartate Amino Transf (AST/SGOT) 23, Alanine Aminotransferase (

ALT/SGPT) 17, Alkaline Phosphatase 83, Total Protein 6.9, Albumin 2.4L, 

Globulin 4.5, Albumin/Globulin Ratio 0.5L


11/14/17 13:25: Vancomycin Level Trough 26.5H


Height (Feet):  5


Height (Inches):  4.00


Weight (Pounds):  140


General Appearance:  no apparent distress


EENT:  PERRL/EOMI


Neck:  supple


Cardiovascular:  tachycardia, systolic murmur


Respiratory/Chest:  lungs clear


Abdomen:  soft


Extremities:  non-tender


Neurologic:  CNs II-XII grossly normal











Clayton Moon MD Nov 14, 2017 17:33

## 2017-11-14 NOTE — INFECTIOUS DISEASES PROG NOTE
Assessment/Plan


Assessment/Plan








Assesment:








-Syncopal episode- r/o anatomic obstruction due to vegetations vs conduction 

abnormalities vs other


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


-Afebrile, no leukocytosis


- 2D Echo : Aortic Veg +


Elev ESR : 119








HLD


OA


spinal stenosis


DM2


Bipolar disorder


Spondylolisthesis


Asthma  





Plan:





-Will continue her already established treatment for endocarditis with  

Doxycycline (untill 2/5/18) ,Gentamycin 60mg IV bid (untill 11/19/11), IV 

Vancomycin (until 12/12/17)


  -monitor platelets, Cr


  -typically bartonella endocarditis consist of Doxy for 6-12  weeks and Genta 

for 2 weeks; however patient has been managed by another ID physician as an 

outpatient E


monitor  Bcx


-bartonella serologies


-f/u cx


-Monitor CBC/BMP, temperatures





Subjective


Allergies:  


Coded Allergies:  


     CEPHALEXIN (Verified  Allergy, Intermediate, diffuse maculopapular rash, 11 /11/17)


Uncoded Allergies:  


     Baby oil (Allergy, Severe, Itching, 11/10/17)


Subjective








afebrile





Objective


Vital Signs





Last 24 Hour Vital Signs








  Date Time  Temp Pulse Resp B/P (MAP) Pulse Ox O2 Delivery O2 Flow Rate FiO2


 


11/14/17 08:00 97.1 108 20 127/57 97 Room Air  


 


11/14/17 04:49 97.5 94 18 119/52 95 Room Air  


 


11/14/17 04:00  106      


 


11/14/17 00:42 97.5 100 18 121/55 92 Room Air  


 


11/14/17 00:00  106      


 


11/13/17 20:35 97.5 109 18 115/55 98 Room Air  


 


11/13/17 20:00  110      


 


11/13/17 19:30  110 20   Room Air  21


 


11/13/17 16:00 98.6 109 21 110/50 93 Nasal Cannula 2.0 


 


11/13/17 16:00  109      


 


11/13/17 12:00 97.9 108 20 127/63 93 Room Air  


 


11/13/17 12:00  114      








Height (Feet):  5


Height (Inches):  4.00


Weight (Pounds):  140


HEENT:  mucous membranes moist


Respiratory/Chest:  normal breath sounds


Cardiovascular:  normal peripheral pulses


Abdomen:  soft, non tender





Laboratory Tests








Test


  11/13/17


13:05 11/14/17


06:00


 


Vancomycin Level Trough


  9.2 ug/mL


(5.0-12.0) 


 


 


Sodium Level


  


  139 MMOL/L


(136-145)


 


Potassium Level


  


  3.6 MMOL/L


(3.5-5.1)


 


Chloride Level


  


  110 MMOL/L


()  H


 


Carbon Dioxide Level


  


  19 MMOL/L


(21-32)  L


 


Anion Gap


  


  10 mmol/L


(5-15)


 


Blood Urea Nitrogen


  


  15 mg/dL


(7-18)


 


Creatinine


  


  0.6 MG/DL


(0.55-1.30)


 


Estimat Glomerular Filtration


Rate 


  > 60 mL/min


(>60)


 


Glucose Level


  


  139 MG/DL


()  H


 


Calcium Level


  


  8.2 MG/DL


(8.5-10.1)  L


 


Total Bilirubin


  


  0.3 MG/DL


(0.2-1.0)


 


Aspartate Amino Transf


(AST/SGOT) 


  23 U/L (15-37)


 


 


Alanine Aminotransferase


(ALT/SGPT) 


  17 U/L (12-78)


 


 


Alkaline Phosphatase


  


  83 U/L


()


 


Total Protein


  


  6.9 G/DL


(6.4-8.2)


 


Albumin


  


  2.4 G/DL


(3.4-5.0)  L


 


Globulin  4.5 g/dL  


 


Albumin/Globulin Ratio


  


  0.5 (1.0-2.7)


L











Current Medications








 Medications


  (Trade)  Dose


 Ordered  Sig/Lakshmi


 Route


 PRN Reason  Start Time


 Stop Time Status Last Admin


Dose Admin


 


 Acetaminophen


  (Tylenol)  650 mg  Q4H  PRN


 ORAL


 For Pain  11/10/17 21:00


 12/10/17 20:59   


 


 


 Acetaminophen/


 Hydrocodone Bitart


  (Norco 5/325)  1 tab  Q6H  PRN


 ORAL


 For Moderate to Severe Pain  11/10/17 21:00


 11/17/17 20:59   


 


 


 Albuterol Sulfate


  (Proventil)  2.5 mg  Q4H  PRN


 HHN


 Shortness of Breath  11/10/17 21:00


 11/15/17 20:59   


 


 


 Aspirin


  (Ecotrin)  81 mg  DAILY


 ORAL


   11/11/17 09:00


 12/11/17 08:59  11/14/17 08:33


 


 


 Atorvastatin


 Calcium


  (Lipitor)  40 mg  BEDTIME


 ORAL


   11/10/17 22:30


 12/10/17 22:29  11/13/17 20:40


 


 


 Chlorhexidine


 Gluconate


  (Althea-Hex 2%)  1 applic  DAILY@2000


 TOPIC


   11/11/17 20:00


 12/11/17 19:59  11/13/17 20:50


 


 


 Clobetasol


 Propionate


  (Temovate)  1 applic  Q12HR


 TOPIC


   11/12/17 21:00


 12/12/17 20:59  11/13/17 20:51


 


 


 Diphenhydramine


 HCl


  (Benadryl)  25 mg  Q6H  PRN


 IVP


 Itching  11/10/17 22:00


 12/10/17 21:59  11/12/17 16:37


 


 


 Docusate Sodium


  (Colace)  100 mg  TWICE A  DAY


 ORAL


   11/11/17 09:00


 12/11/17 08:59  11/13/17 18:01


 


 


 Doxycycline


 Monohydrate


  (Vibramycin)  100 mg  EVERY 12  HOURS


 ORAL


   11/10/17 22:30


 11/17/17 22:29  11/14/17 08:33


 


 


 Enoxaparin Sodium


  (Lovenox)  40 mg  DAILY


 SUBQ


   11/11/17 13:00


 12/11/17 12:59  11/14/17 08:38


 


 


 Fluoxetine HCl


  (PROzac)  20 mg  DAILY


 ORAL


   11/11/17 09:00


 12/11/17 08:59  11/14/17 08:35


 


 


 Gentamicin


 Protocol


  (Gentamicin


 pharmacy to dose)  1 ea  DAILY  PRN


 MISC


 Per rx protocol  11/11/17 13:00


 11/19/17 23:55   


 


 


 Gentamicin


 Sulfate 60 mg/


 Sodium Chloride  56.5 ml @ 


 110 mls/hr  Q12H


 IVPB


   11/11/17 16:00


 11/18/17 15:59  11/14/17 03:52


 


 


 Metformin HCl


  (Glucophage)  850 mg  BID


 ORAL


   11/11/17 09:00


 12/11/17 08:59  11/14/17 08:35


 


 


 Olanzapine


  (ZyPREXA)  5 mg  BEDTIME


 ORAL


   11/10/17 22:30


 12/10/17 22:29  11/13/17 20:40


 


 


 Vancomycin HCl


  (Vanco rx to


 dose)  1 ea  DAILY  PRN


 MISC


 Per rx protocol  11/11/17 13:00


 12/11/17 12:59   


 


 


 Vancomycin/Sodium


 Chloride  250 ml @ 


 166.667


 mls/hr  Q8HR


 IVPB


   11/13/17 22:00


 11/18/17 21:59  11/14/17 05:41


 

















ALBAKSHAT MILTON M.D. Nov 14, 2017 09:36

## 2017-11-14 NOTE — CONSULTATION
History of Present Illness


General


Chief Complaint:  Syncope


Referring physician:  Dr. Moon


Reason for Consultation:  abnormal cxr





Present Illness


HPI


67-year-old female brought in by EMS after being sent in by PMD the patient was 

noted to have recent syncopal episode. the pt has expressed worsening of 

depression


Allergies:  


Coded Allergies:  


     CEPHALEXIN (Verified  Allergy, Intermediate, diffuse maculopapular rash, 11 /11/17)


Uncoded Allergies:  


     Baby oil (Allergy, Severe, Itching, 11/10/17)





Medication History


Scheduled


Aspirin* (Aspir 81*), 81 MG ORAL DAILY, (Reported)


Atorvastatin Calcium* (Atorvastatin Calcium*), 40 MG ORAL BEDTIME, (Reported)


Cephalexin* (Cephalexin*), 500 MG ORAL EVERY 6 HOURS, (Reported)


Docusate Sodium* (Colace*), 100 MG ORAL TWICE A DAY, (Reported)


Doxycycline Hyclate* (Vibramycin*), 100 MG ORAL EVERY 12 HOURS, (Reported)


Enoxaparin* (Lovenox*), 40 MG SUBQ BID, (Reported)


Fluoxetine Hcl* (Fluoxetine Hcl*), 20 MG ORAL DAILY, (Reported)


Gentamicin In Nacl, Iso-Osm (Gentamicin 70 Mg/Ns 50 Ml Pb), 60 MG IV BID, (

Reported)


Metformin Hcl* (Metformin Hcl*), 850 MG ORAL BID, (Reported)


Olanzapine* (Zyprexa*), 5 MG ORAL BEDTIME, (Reported)


Vancomycin HCl/D5w (Vancomycin 1.5 Gram/250 ml-D5w), 1.5 GM IV BID, (Reported)


[Lispro], Unknown Dose SUBQ BEFORE MEALS, (Reported)





Scheduled PRN


Acetaminophen* (Acetaminophen 325MG Tablet*), 650 MG ORAL Q4H PRN for For Pain, 

(Reported)


Albuterol Sulfate* (Albuterol Sulfate Hhn*), 3 ML INH Q4H PRN for Shortness of 

Breath, (Reported)


Hydrocodone Bit/Acetaminophen 5-325* (Norco 5-325*), 1 TAB ORAL Q6H PRN for For 

Pain, (Reported)





Patient History


History Provided By:  Patient, Medical Record, PMD


Healthcare decision maker


N


Resuscitation status


Full Code


Advanced Directive on File








Past Medical/Surgical History


Past Medical/Surgical History:  


(1) Anemia


(2) Endocarditis


(3) QT prolongation


(4) Cardiomegaly


(5) Syncope





Review of Systems


Psychiatric:  Reports: prior hx, anxiety, depressed feelings, emotional problems





Physical Exam


General Appearance:  no apparent distress, alert


Neurologic:  alert, oriented x 3, responsive, depressed affect





Last 24 Hour Vital Signs








  Date Time  Temp Pulse Resp B/P (MAP) Pulse Ox O2 Delivery O2 Flow Rate FiO2


 


11/14/17 20:42 99.0 99 18 110/56 95 Room Air  


 


11/14/17 19:40  112 18   Room Air  21


 


11/14/17 16:00 97.8 112 21 133/59 96 Room Air  


 


11/14/17 16:00  108      


 


11/14/17 12:00 97.5 80 20 125/65 96 Room Air  


 


11/14/17 12:00  107      


 


11/14/17 08:35  87 20   Room Air  21


 


11/14/17 08:00  110      


 


11/14/17 08:00 97.1 108 20 127/57 97 Room Air  


 


11/14/17 04:49 97.5 94 18 119/52 95 Room Air  


 


11/14/17 04:00  106      


 


11/14/17 00:42 97.5 100 18 121/55 92 Room Air  


 


11/14/17 00:00  106      

















Intake and Output  


 


 11/14/17 11/15/17





 19:00 07:00


 


  


 


# Bowel Movements 2 











Laboratory Tests








Test


  11/14/17


06:00 11/14/17


13:25


 


Sodium Level


  139 MMOL/L


(136-145) 


 


 


Potassium Level


  3.6 MMOL/L


(3.5-5.1) 


 


 


Chloride Level


  110 MMOL/L


()  H 


 


 


Carbon Dioxide Level


  19 MMOL/L


(21-32)  L 


 


 


Anion Gap


  10 mmol/L


(5-15) 


 


 


Blood Urea Nitrogen


  15 mg/dL


(7-18) 


 


 


Creatinine


  0.6 MG/DL


(0.55-1.30) 


 


 


Estimat Glomerular Filtration


Rate > 60 mL/min


(>60) 


 


 


Glucose Level


  139 MG/DL


()  H 


 


 


Calcium Level


  8.2 MG/DL


(8.5-10.1)  L 


 


 


Total Bilirubin


  0.3 MG/DL


(0.2-1.0) 


 


 


Aspartate Amino Transf


(AST/SGOT) 23 U/L (15-37)


  


 


 


Alanine Aminotransferase


(ALT/SGPT) 17 U/L (12-78)


  


 


 


Alkaline Phosphatase


  83 U/L


() 


 


 


Total Protein


  6.9 G/DL


(6.4-8.2) 


 


 


Albumin


  2.4 G/DL


(3.4-5.0)  L 


 


 


Globulin 4.5 g/dL   


 


Albumin/Globulin Ratio


  0.5 (1.0-2.7)


L 


 


 


Vancomycin Level Trough


  


  26.5 ug/mL


(5.0-12.0)  H








Height (Feet):  5


Height (Inches):  4.00


Weight (Pounds):  140


Medications





Current Medications








 Medications


  (Trade)  Dose


 Ordered  Sig/Lakshmi


 Route


 PRN Reason  Start Time


 Stop Time Status Last Admin


Dose Admin


 


 Acetaminophen


  (Tylenol)  650 mg  Q4H  PRN


 ORAL


 For Pain  11/10/17 21:00


 12/10/17 20:59   


 


 


 Acetaminophen/


 Hydrocodone Bitart


  (Norco 5/325)  1 tab  Q6H  PRN


 ORAL


 For Moderate to Severe Pain  11/10/17 21:00


 11/17/17 20:59   


 


 


 Albuterol Sulfate


  (Proventil)  2.5 mg  Q4H  PRN


 HHN


 Shortness of Breath  11/10/17 21:00


 11/15/17 20:59   


 


 


 Aspirin


  (Ecotrin)  81 mg  DAILY


 ORAL


   11/11/17 09:00


 12/11/17 08:59  11/14/17 08:33


 


 


 Atorvastatin


 Calcium


  (Lipitor)  40 mg  BEDTIME


 ORAL


   11/10/17 22:30


 12/10/17 22:29  11/14/17 21:06


 


 


 Chlorhexidine


 Gluconate


  (Althea-Hex 2%)  1 applic  DAILY@2000


 TOPIC


   11/11/17 20:00


 12/11/17 19:59  11/14/17 19:55


 


 


 Clobetasol


 Propionate


  (Temovate)  1 applic  Q12HR


 TOPIC


   11/12/17 21:00


 12/12/17 20:59  11/14/17 21:07


 


 


 Diphenhydramine


 HCl


  (Benadryl)  25 mg  Q6H  PRN


 IVP


 Itching  11/10/17 22:00


 12/10/17 21:59  11/12/17 16:37


 


 


 Docusate Sodium


  (Colace)  100 mg  TWICE A  DAY


 ORAL


   11/11/17 09:00


 12/11/17 08:59  11/13/17 18:01


 


 


 Doxycycline


 Monohydrate


  (Vibramycin)  100 mg  EVERY 12  HOURS


 ORAL


   11/10/17 22:30


 11/17/17 22:29  11/14/17 21:06


 


 


 Enoxaparin Sodium


  (Lovenox)  40 mg  DAILY


 SUBQ


   11/11/17 13:00


 12/11/17 12:59  11/14/17 08:38


 


 


 Fluoxetine HCl


  (PROzac)  20 mg  DAILY


 ORAL


   11/11/17 09:00


 12/11/17 08:59  11/14/17 08:35


 


 


 Gentamicin


 Protocol


  (Gentamicin


 pharmacy to dose)  1 ea  DAILY  PRN


 MISC


 Per rx protocol  11/11/17 13:00


 11/19/17 23:55   


 


 


 Gentamicin


 Sulfate 60 mg/


 Sodium Chloride  56.5 ml @ 


 110 mls/hr  Q12HR


 IVPB


   11/14/17 21:00


 11/21/17 20:59  11/14/17 21:06


 


 


 Metformin HCl


  (Glucophage)  850 mg  BID


 ORAL


   11/11/17 09:00


 12/11/17 08:59  11/14/17 18:14


 


 


 Olanzapine


  (ZyPREXA)  5 mg  BEDTIME


 ORAL


   11/10/17 22:30


 12/10/17 22:29  11/14/17 21:06


 


 


 Vancomycin HCl


  (Vanco rx to


 dose)  1 ea  DAILY  PRN


 MISC


 Per rx protocol  11/11/17 13:00


 12/11/17 12:59   


 


 


 Vancomycin HCl 1


 gm/Dextrose  275 ml @ 


 183.708


 mls/hr  Q12HR@1030,2230


 IVPB


   11/14/17 22:30


 11/19/17 22:29   


 











Assessment/Plan


Status:  stable


Assessment/Plan


mdd





prozac 40mg


zyprexa 5mg











Laya Harrell M.D. Nov 14, 2017 22:28

## 2017-11-15 VITALS — DIASTOLIC BLOOD PRESSURE: 61 MMHG | SYSTOLIC BLOOD PRESSURE: 114 MMHG

## 2017-11-15 VITALS — DIASTOLIC BLOOD PRESSURE: 60 MMHG | SYSTOLIC BLOOD PRESSURE: 86 MMHG

## 2017-11-15 VITALS — DIASTOLIC BLOOD PRESSURE: 36 MMHG | SYSTOLIC BLOOD PRESSURE: 115 MMHG

## 2017-11-15 VITALS — DIASTOLIC BLOOD PRESSURE: 67 MMHG | SYSTOLIC BLOOD PRESSURE: 122 MMHG

## 2017-11-15 VITALS — DIASTOLIC BLOOD PRESSURE: 64 MMHG | SYSTOLIC BLOOD PRESSURE: 120 MMHG

## 2017-11-15 VITALS — DIASTOLIC BLOOD PRESSURE: 61 MMHG | SYSTOLIC BLOOD PRESSURE: 140 MMHG

## 2017-11-15 VITALS — DIASTOLIC BLOOD PRESSURE: 21 MMHG | SYSTOLIC BLOOD PRESSURE: 93 MMHG

## 2017-11-15 VITALS — SYSTOLIC BLOOD PRESSURE: 141 MMHG | DIASTOLIC BLOOD PRESSURE: 72 MMHG

## 2017-11-15 VITALS — DIASTOLIC BLOOD PRESSURE: 54 MMHG | SYSTOLIC BLOOD PRESSURE: 114 MMHG

## 2017-11-15 VITALS — DIASTOLIC BLOOD PRESSURE: 37 MMHG | SYSTOLIC BLOOD PRESSURE: 104 MMHG

## 2017-11-15 VITALS — SYSTOLIC BLOOD PRESSURE: 142 MMHG | DIASTOLIC BLOOD PRESSURE: 73 MMHG

## 2017-11-15 VITALS — SYSTOLIC BLOOD PRESSURE: 112 MMHG | DIASTOLIC BLOOD PRESSURE: 36 MMHG

## 2017-11-15 VITALS — DIASTOLIC BLOOD PRESSURE: 56 MMHG | SYSTOLIC BLOOD PRESSURE: 111 MMHG

## 2017-11-15 VITALS — SYSTOLIC BLOOD PRESSURE: 148 MMHG | DIASTOLIC BLOOD PRESSURE: 72 MMHG

## 2017-11-15 VITALS — DIASTOLIC BLOOD PRESSURE: 58 MMHG | SYSTOLIC BLOOD PRESSURE: 117 MMHG

## 2017-11-15 VITALS — SYSTOLIC BLOOD PRESSURE: 148 MMHG | DIASTOLIC BLOOD PRESSURE: 71 MMHG

## 2017-11-15 VITALS — SYSTOLIC BLOOD PRESSURE: 144 MMHG | DIASTOLIC BLOOD PRESSURE: 60 MMHG

## 2017-11-15 VITALS — DIASTOLIC BLOOD PRESSURE: 43 MMHG | SYSTOLIC BLOOD PRESSURE: 112 MMHG

## 2017-11-15 VITALS — DIASTOLIC BLOOD PRESSURE: 51 MMHG | SYSTOLIC BLOOD PRESSURE: 111 MMHG

## 2017-11-15 VITALS — SYSTOLIC BLOOD PRESSURE: 108 MMHG | DIASTOLIC BLOOD PRESSURE: 52 MMHG

## 2017-11-15 VITALS — SYSTOLIC BLOOD PRESSURE: 122 MMHG | DIASTOLIC BLOOD PRESSURE: 47 MMHG

## 2017-11-15 LAB
ALBUMIN/GLOB SERPL: 0.6 {RATIO} (ref 1–2.7)
ALT SERPL-CCNC: 23 U/L (ref 12–78)
ANION GAP SERPL CALC-SCNC: 11 MMOL/L (ref 5–15)
AST SERPL-CCNC: 22 U/L (ref 15–37)
CALCIUM SERPL-MCNC: 8.7 MG/DL (ref 8.5–10.1)
CHLORIDE SERPL-SCNC: 106 MMOL/L (ref 98–107)
CO2 SERPL-SCNC: 21 MMOL/L (ref 21–32)
CREAT SERPL-MCNC: 0.8 MG/DL (ref 0.55–1.3)
GFR SERPLBLD BASED ON 1.73 SQ M-ARVRAT: > 60 ML/MIN (ref 60–?)
GLOBULIN SER-MCNC: 4.6 G/DL
POTASSIUM SERPL-SCNC: 3.5 MMOL/L (ref 3.5–5.1)
PROT SERPL-MCNC: 7.3 G/DL (ref 6.4–8.2)
SODIUM SERPL-SCNC: 138 MMOL/L (ref 136–145)

## 2017-11-15 PROCEDURE — B246ZZ4 ULTRASONOGRAPHY OF RIGHT AND LEFT HEART, TRANSESOPHAGEAL: ICD-10-PCS

## 2017-11-15 RX ADMIN — ENOXAPARIN SODIUM SCH MG: 40 INJECTION SUBCUTANEOUS at 09:00

## 2017-11-15 RX ADMIN — CLOBETASOL PROPIONATE SCH APPLIC: 0.5 OINTMENT TOPICAL at 12:07

## 2017-11-15 RX ADMIN — DOCUSATE SODIUM SCH MG: 100 CAPSULE, LIQUID FILLED ORAL at 12:07

## 2017-11-15 RX ADMIN — SODIUM CHLORIDE SCH MLS/HR: 9 INJECTION, SOLUTION INTRAVENOUS at 12:58

## 2017-11-15 RX ADMIN — SODIUM CHLORIDE SCH MLS/HR: 0.9 INJECTION INTRAVENOUS at 12:07

## 2017-11-15 NOTE — GENERAL PROGRESS NOTE
Assessment/Plan


Status:  stable, progressing





Subjective


Date patient seen:  Nov 12, 2017


Neurologic/Psychiatric:  Reports: anxiety, depressed, emotional problems


Allergies:  


Coded Allergies:  


     CEPHALEXIN (Verified  Allergy, Intermediate, diffuse maculopapular rash, 11 /11/17)


Uncoded Allergies:  


     Baby oil (Allergy, Severe, Itching, 11/10/17)





Objective





Last 24 Hour Vital Signs








  Date Time  Temp Pulse Resp B/P (MAP) Pulse Ox O2 Delivery O2 Flow Rate FiO2


 


11/15/17 18:45  117 28 148/72 99 Nasal Cannula 2.0 


 


11/15/17 18:00  112 32 86/60 98 Nasal Cannula 2.0 


 


11/15/17 17:00  117 26 111/51 96 Nasal Cannula 2.0 


 


11/15/17 16:00  120      


 


11/15/17 16:00 98.6 117 27 93/21 98 Nasal Cannula 2.0 


 


11/15/17 15:00  119 29 108/52 98 Nasal Cannula 2.0 


 


11/15/17 14:00  115 26 104/37 98 Nasal Cannula 2.0 


 


11/15/17 13:00  121 28 112/43 97 Nasal Cannula 2.0 


 


11/15/17 12:00 98.0 108 27 112/36 99 Nasal Cannula 2.0 


 


11/15/17 12:00  109      


 


11/15/17 11:00  112 28 115/36 97 Nasal Cannula 2.0 


 


11/15/17 10:00  106 25 122/47 98 Nasal Cannula 2.0 


 


11/15/17 09:00  115      


 


11/15/17 09:00  108 28 140/61 99 Nasal Cannula 2.0 


 


11/15/17 08:57  104 43  97   


 


11/15/17 08:53  106 40  97   


 


11/15/17 08:45  117 30 144/60 100 Nasal Cannula 2.0 


 


11/15/17 08:40 98.6 120 29 148/71 100 Nasal Cannula 2.0 


 


11/15/17 08:40  119      


 


11/15/17 08:35  114 34 141/72 100 Nasal Cannula 2.0 


 


11/15/17 08:24  110 32 142/73 100 Nasal Cannula 2.0 


 


11/15/17 08:15  101 28 122/67 100 Nasal Cannula 2.0 


 


11/15/17 08:10  101 28 114/61 100 Nasal Cannula 2.0 


 


11/15/17 08:05  104 26 117/58 97 Nasal Cannula 2.0 


 


11/15/17 08:01 98.9 105 24 120/64 97 Nasal Cannula 2.0 


 


11/15/17 08:00  105      


 


11/15/17 07:44      Room Air  


 


11/15/17 04:51 99.7 106 18 111/56 96 Nasal Cannula 2.0 


 


11/15/17 04:00  98      


 


11/15/17 00:46 98.4 98 18 114/54 94 Room Air  


 


11/15/17 00:00  112      


 


11/14/17 20:42 99.0 99 18 110/56 95 Room Air  

















Intake and Output  


 


 11/15/17 11/16/17





 19:00 07:00


 


Intake Total 2152.908 ml 


 


Output Total 850 ml 


 


Balance 1302.908 ml 


 


  


 


Intake Oral 1100 ml 


 


IV Total 1052.908 ml 


 


Output Urine Total 850 ml 


 


Estimated Blood Loss 0 ml 


 


# Voids 5 








Laboratory Tests


11/15/17 07:00: 


Sodium Level 138, Potassium Level 3.5, Chloride Level 106, Carbon Dioxide Level 

21, Anion Gap 11, Blood Urea Nitrogen 16, Creatinine 0.8, Estimat Glomerular 

Filtration Rate > 60, Glucose Level 148H, Calcium Level 8.7, Total Bilirubin 0.4

, Aspartate Amino Transf (AST/SGOT) 22, Alanine Aminotransferase (ALT/SGPT) 23, 

Alkaline Phosphatase 90, Total Protein 7.3, Albumin 2.7L, Globulin 4.6, Albumin/

Globulin Ratio 0.6L


Height (Feet):  5


Height (Inches):  4.00


Weight (Pounds):  140


General Appearance:  no apparent distress, alert


Neurologic:  alert, oriented x 3, responsive, depressed affect











Laya Harrell M.D. Nov 15, 2017 20:22

## 2017-11-15 NOTE — CARDIOLOGY PROGRESS NOTE
Assessment/Plan


Assessment/Plan


1. Sinus tachycardia, probably heart failure due to severely decompensated 

aortic regurgitation with pressure half time <200. Transfer to ICU for Nipride 

gtt.


2. Severe aortic valve regurgitation due to endocarditis causing deformity and 

calcification of the valve leaflets, in view of the wide pulse pressure, cannot 

chronicity of aortic regurgitation.


3. Bilateral pleural effusion


4. Syncopal event due to decreased forward flow.





Subjective


Subjective


Sinus tachycardia at 105.


Tachypneic


s/p KITTY





Objective





Last 24 Hour Vital Signs








  Date Time  Temp Pulse Resp B/P (MAP) Pulse Ox O2 Delivery O2 Flow Rate FiO2


 


11/15/17 04:51 99.7 106 18 111/56 96 Nasal Cannula 2.0 


 


11/15/17 04:00  98      


 


11/15/17 00:46 98.4 98 18 114/54 94 Room Air  


 


11/15/17 00:00  112      


 


11/14/17 20:42 99.0 99 18 110/56 95 Room Air  


 


11/14/17 20:00  107      


 


11/14/17 19:40  112 18   Room Air  21


 


11/14/17 16:00 97.8 112 21 133/59 96 Room Air  


 


11/14/17 16:00  108      


 


11/14/17 12:00 97.5 80 20 125/65 96 Room Air  


 


11/14/17 12:00  107      


 


11/14/17 08:35  87 20   Room Air  21








2D Echo:  KITTY: LVEF at 45%, severe decompensated AR, - bubble, LVDD, LV enlarged





Laboratory Tests








Test


  11/14/17


13:25 11/15/17


07:00


 


Vancomycin Level Trough


  26.5 ug/mL


(5.0-12.0)  H 


 


 


Sodium Level  Pending  


 


Potassium Level  Pending  


 


Chloride Level  Pending  


 


Carbon Dioxide Level  Pending  


 


Blood Urea Nitrogen  Pending  


 


Creatinine  Pending  


 


Estimat Glomerular Filtration


Rate 


  Pending  


 


 


Glucose Level  Pending  


 


Calcium Level  Pending  


 


Total Bilirubin  Pending  


 


Aspartate Amino Transf


(AST/SGOT) 


  Pending  


 


 


Alanine Aminotransferase


(ALT/SGPT) 


  Pending  


 


 


Alkaline Phosphatase  Pending  


 


Total Protein  Pending  


 


Albumin  Pending  


 


Globulin  Pending  








Objective


GENERAL:  The patient is a very pleasant 67-year-old female, in no apparent


respiratory distress, alert and oriented x4.


HEENT:  Atraumatic and normocephalic.  ENT, pupils are equal, round, and


reactive to light and accommodation.  Extraocular muscles are intact.


NECK:  JVP less than 5 cm.  No carotid bruits.  Carotid upstrokes 2+


bilaterally.


CARDIOVASCULAR:  Normal S1 and S2.  Regular rate and rhythm. Tachycardic. 

Summation gallops


with 2/6 MSM at LSB. PMI is at fourth intercostal space in the midclavicular


line.


LUNGS:  Clear to auscultation bilaterally.


ABDOMEN:  Soft, nontender, and nondistended.  No hepatosplenomegaly.


Positive bowel sounds.


EXTREMITIES:  No evidence of edema, clubbing, or cyanosis.











SAMSON LAWTON Nov 15, 2017 08:12

## 2017-11-15 NOTE — 48 HOUR POST ANESTHESIA EVAL
Post Anesthesia Evaluation


Procedure:  gian


Date of Evaluation:  Nov 15, 2017


Time of Evaluation:  08:56


Blood Pressure Systolic:  133


0:  60


Pulse Rate:  104


Respiratory Rate:  43


Temperature (Fahrenheit):  98.9


O2 Sat by Pulse Oximetry:  97


Airway:  patent


Nausea:  No


Vomiting:  No


Pain Intensity:  0


Hydration Status:  adequate


Cardiopulmonary Status:


stable


Mental Status/LOC:  patient returned to baseline


Post-Anesthesia Complications:


none


Follow-up care needed:  N/A











LINDA COATS Nov 15, 2017 08:57

## 2017-11-15 NOTE — PULMONOLOGY PROGRESS NOTE
Assessment/Plan


Problems:  


(1) Cardiomegaly


(2) Syncope


(3) Anemia


(4) Endocarditis


Assessment/Plan


CT chest reviewed


continue current abx for endocarditis


check electrolytes


all noted, labs, meds reviewed.





keep in teli





Subjective


ROS Limited/Unobtainable:  Yes


Interval Events:  late note for 11/14


Constitutional:  Reports: no symptoms


HEENT:  Repors: no symptoms


Respiratory:  Reports: no symptoms


Allergies:  


Coded Allergies:  


     CEPHALEXIN (Verified  Allergy, Intermediate, diffuse maculopapular rash, 11 /11/17)


Uncoded Allergies:  


     Baby oil (Allergy, Severe, Itching, 11/10/17)





Objective





Last 24 Hour Vital Signs








  Date Time  Temp Pulse Resp B/P (MAP) Pulse Ox O2 Delivery O2 Flow Rate FiO2


 


11/15/17 08:57  104 43  97   


 


11/15/17 08:53  106 40  97   


 


11/15/17 08:45  117 30 144/60 100 Nasal Cannula 2.0 


 


11/15/17 08:40 98.6 120 29 148/71 100 Nasal Cannula 2.0 


 


11/15/17 08:40  119      


 


11/15/17 08:35  114 34 141/72 100 Nasal Cannula 2.0 


 


11/15/17 08:24  110 32 142/73 100 Nasal Cannula 2.0 


 


11/15/17 08:15  101 28 122/67 100 Nasal Cannula 2.0 


 


11/15/17 08:10  101 28 114/61 100 Nasal Cannula 2.0 


 


11/15/17 08:05  104 26 117/58 97 Nasal Cannula 2.0 


 


11/15/17 08:01 98.9 105 24 120/64 97 Nasal Cannula 2.0 


 


11/15/17 08:00  105      


 


11/15/17 07:44      Room Air  


 


11/15/17 04:51 99.7 106 18 111/56 96 Nasal Cannula 2.0 


 


11/15/17 04:00  98      


 


11/15/17 00:46 98.4 98 18 114/54 94 Room Air  


 


11/15/17 00:00  112      


 


11/14/17 20:42 99.0 99 18 110/56 95 Room Air  


 


11/14/17 20:00  107      


 


11/14/17 19:40  112 18   Room Air  21


 


11/14/17 16:00 97.8 112 21 133/59 96 Room Air  


 


11/14/17 16:00  108      


 


11/14/17 12:00 97.5 80 20 125/65 96 Room Air  


 


11/14/17 12:00  107      

















Intake and Output  


 


 11/15/17 11/16/17





 19:00 07:00


 


Intake Total 150 ml 


 


Output Total 300 ml 


 


Balance -150 ml 


 


  


 


IV Total 150 ml 


 


Output Urine Total 300 ml 


 


Estimated Blood Loss 0 ml 


 


# Voids 1 








General Appearance:  WD/WN


HEENT:  normocephalic, atraumatic


Respiratory/Chest:  chest wall non-tender, lungs clear


Breasts:  no masses


Cardiovascular:  normal peripheral pulses, normal rate


Abdomen:  normal bowel sounds, soft, non tender


Genitourinary:  normal external genitalia


Extremities:  no clubbing


Skin:  no rash


Lymphatic:  no neck adenopathy


Laboratory Tests


11/14/17 13:25: Vancomycin Level Trough 26.5H


11/15/17 07:00: 


Sodium Level 138, Potassium Level 3.5, Chloride Level 106, Carbon Dioxide Level 

21, Anion Gap 11, Blood Urea Nitrogen 16, Creatinine 0.8, Estimat Glomerular 

Filtration Rate > 60, Glucose Level 148H, Calcium Level 8.7, Total Bilirubin 0.4

, Aspartate Amino Transf (AST/SGOT) 22, Alanine Aminotransferase (ALT/SGPT) 23, 

Alkaline Phosphatase 90, Total Protein 7.3, Albumin 2.7L, Globulin 4.6, Albumin/

Globulin Ratio 0.6L





Current Medications








 Medications


  (Trade)  Dose


 Ordered  Sig/Lakshmi


 Route


 PRN Reason  Start Time


 Stop Time Status Last Admin


Dose Admin


 


 Acetaminophen


  (Tylenol)  650 mg  Q4H  PRN


 ORAL


 For Pain  11/10/17 21:00


 12/10/17 20:59   


 


 


 Acetaminophen/


 Hydrocodone Bitart


  (Norco 5/325)  1 tab  Q6H  PRN


 ORAL


 For Moderate to Severe Pain  11/10/17 21:00


 11/17/17 20:59   


 


 


 Albuterol Sulfate


  (Proventil)  2.5 mg  Q4H  PRN


 HHN


 Shortness of Breath  11/10/17 21:00


 11/15/17 20:59   


 


 


 Atorvastatin


 Calcium


  (Lipitor)  40 mg  BEDTIME


 ORAL


   11/10/17 22:30


 12/10/17 22:29  11/14/17 21:06


 


 


 Atropine Sulfate


  (Atropine)  0.5 mg  Q5M  PRN


 IV


 bpm less than 45  11/15/17 08:15


 11/15/17 14:00   


 


 


 Chlorhexidine


 Gluconate


  (Althea-Hex 2%)  1 applic  DAILY@2000


 TOPIC


   11/11/17 20:00


 12/11/17 19:59  11/14/17 19:55


 


 


 Clobetasol


 Propionate


  (Temovate)  1 applic  Q12HR


 TOPIC


   11/12/17 21:00


 12/12/17 20:59  11/14/17 21:07


 


 


 Diphenhydramine


 HCl


  (Benadryl)  25 mg  Q15M  PRN


 IVP


 Itching  11/15/17 08:15


 11/15/17 14:00   


 


 


 Diphenhydramine


 HCl


  (Benadryl)  25 mg  Q6H  PRN


 IVP


 Itching  11/10/17 22:00


 12/10/17 21:59  11/12/17 16:37


 


 


 Docusate Sodium


  (Colace)  100 mg  TWICE A  DAY


 ORAL


   11/11/17 09:00


 12/11/17 08:59  11/13/17 18:01


 


 


 Doxycycline


 Monohydrate


  (Vibramycin)  100 mg  EVERY 12  HOURS


 ORAL


   11/10/17 22:30


 11/17/17 22:29  11/14/17 21:06


 


 


 Enoxaparin Sodium


  (Lovenox)  40 mg  DAILY


 SUBQ


   11/11/17 13:00


 12/11/17 12:59  11/14/17 08:38


 


 


 Fentanyl Citrate


  (Sublimaze 100


 mcg/2 mL)  25 mcg  Q10M  PRN


 IV


 Moderate Pain (Pain Scale 4-6)  11/15/17 08:15


 11/15/17 14:00   


 


 


 Fluoxetine HCl


  (PROzac)  40 mg  DAILY


 ORAL


   11/15/17 09:00


 12/15/17 08:59   


 


 


 Gentamicin


 Protocol


  (Gentamicin


 pharmacy to dose)  1 ea  DAILY  PRN


 MISC


 Per rx protocol  11/11/17 13:00


 11/19/17 23:55   


 


 


 Gentamicin


 Sulfate 60 mg/


 Sodium Chloride  56.5 ml @ 


 110 mls/hr  Q12HR


 IVPB


   11/14/17 21:00


 11/21/17 20:59  11/14/17 21:06


 


 


 Hydralazine HCl


  (Apresoline)  5 mg  Q30M  PRN


 IV


 SBP>160 OR___/DBP>90 OR___  11/15/17 08:15


 11/15/17 14:00   


 


 


 Metformin HCl


  (Glucophage)  850 mg  BID


 ORAL


   11/11/17 09:00


 12/11/17 08:59  11/14/17 18:14


 


 


 Midazolam HCl


  (Versed 2mg/2ml


 vial)  1 mg  Q15M  PRN


 IVP


 For Anxiety  11/15/17 08:15


 11/15/17 14:00   


 


 


 Olanzapine


  (ZyPREXA)  5 mg  BEDTIME


 ORAL


   11/10/17 22:30


 12/10/17 22:29  11/14/17 21:06


 


 


 Ondansetron HCl


  (Zofran)  4 mg  Q1H  PRN


 IVP


 Nausea & Vomiting  11/15/17 08:15


 11/15/17 14:00   


 


 


 Sodium


 Nitroprusside 50


 mg/Dextrose  250 ml @ 


 5.71 mls/hr  Q24H


 IV


   11/15/17 08:15


 12/15/17 08:14   


 


 


 Vancomycin HCl


  (Vanco rx to


 dose)  1 ea  DAILY  PRN


 MISC


 Per rx protocol  11/11/17 13:00


 12/11/17 12:59   


 


 


 Vancomycin HCl 1


 gm/Dextrose  275 ml @ 


 183.708


 mls/hr  Q12HR@1030,2230


 IVPB


   11/14/17 22:30


 11/19/17 22:29  11/14/17 23:01


 

















AP LINDO Nov 15, 2017 11:28

## 2017-11-15 NOTE — ANETHESIA PREOPERATIVE EVAL
Anesthesia Pre-op PMH/ROS


General


Date of Evaluation:  Nov 15, 2017


Time of Evaluation:  06:36


Anesthesiologist:  jimmy


ASA Score:  ASA 3


Mallampati Score


Class I : Soft palate, uvula, fauces, pillars visible


Class II: Soft palate, uvula, fauces visible


Class III: Soft palate, base of uvula visible


Class IV: Only hard plate visible


Mallampati Classification:  Class II


Surgeon:  adam


Diagnosis:  endocarditis


Surgical Procedure:  gian


Anesthesia History:  none


Social History:  current smoker


Family History:  no anesthesia problems


Allergies:  


Coded Allergies:  


     CEPHALEXIN (Verified  Allergy, Intermediate, diffuse maculopapular rash, 11 /11/17)


Uncoded Allergies:  


     Baby oil (Allergy, Severe, Itching, 11/10/17)


Medications:  see eMAR





Past Medical History


Cardiovascular:  Reports: HTN, other - endocarditis, cardiomegaly


Pulmonary:  Reports: asthma


Neurologic/Psychiatric:  Reports: depression/anxiety


Endocrine:  Reports: DM


Hematology/Immune:  Reports: anemia


Musculoskeletal/Integumentary:  Reports: OA





Anesthesia Pre-op Phys. Exam


Physician Exam





Last Vital Signs








  Date Time  Temp Pulse Resp B/P (MAP) Pulse Ox O2 Delivery O2 Flow Rate FiO2


 


11/15/17 04:51 99.7 106 18 111/56 85 Room Air  


 


11/14/17 19:40        21


 


11/13/17 16:00       2.0 








Constitutional:  NAD


Neurologic:  CN 2-12 intact


Cardiovascular:  other - tachycardic


Respiratory:  CTA, other - tachypneic


Gastrointestinal:  S/NT/ND





Airway Exam


Mallampati Score:  Class II


MO:  full


Neck:  supple


TMD:  2fb


ROM:  full


Teeth:  missing





Anesthesia Pre-op A/P


Labs





Labs








Test


  11/12/17


15:30 11/12/17


17:00 11/12/17


20:15 11/13/17


06:00


 


Gentamicin Level Trough


  0.7 ug/mL


(0.3-2.0) 


  


  


 


 


Gentamicin Level Peak


  


  2.1 ug/mL


(4.0-10.0) 


  


 


 


Stool Occult Blood


  


  


  Negative


(NEGATIVE) 


 


 


White Blood Count


  


  


  


  3.0 K/UL


(4.8-10.8)


 


Red Blood Count


  


  


  


  2.95 M/UL


(4.20-5.40)


 


Hemoglobin


  


  


  


  8.5 G/DL


(12.0-16.0)


 


Hematocrit


  


  


  


  26.6 %


(37.0-47.0)


 


Mean Corpuscular Volume    90 FL (80-99) 


 


Mean Corpuscular Hemoglobin


  


  


  


  28.9 PG


(27.0-31.0)


 


Mean Corpuscular Hemoglobin


Concent 


  


  


  32.0 G/DL


(32.0-36.0)


 


Red Cell Distribution Width


  


  


  


  15.0 %


(11.6-14.8)


 


Platelet Count


  


  


  


  190 K/UL


(150-450)


 


Mean Platelet Volume


  


  


  


  6.7 FL


(6.5-10.1)


 


Neutrophils (%) (Auto)     % (45.0-75.0) 


 


Lymphocytes (%) (Auto)     % (20.0-45.0) 


 


Monocytes (%) (Auto)     % (1.0-10.0) 


 


Eosinophils (%) (Auto)     % (0.0-3.0) 


 


Basophils (%) (Auto)     % (0.0-2.0) 


 


Differential Total Cells


Counted 


  


  


  100 


 


 


Neutrophils % (Manual)    80 % (45-75) 


 


Lymphocytes % (Manual)    13 % (20-45) 


 


Monocytes % (Manual)    7 % (1-10) 


 


Eosinophils % (Manual)    0 % (0-3) 


 


Basophils % (Manual)    0 % (0-2) 


 


Band Neutrophils    0 % (0-8) 


 


Other Cell Type


  


  


  


  Pathologist


comment


 


Platelet Estimate    Adequate 


 


Platelet Morphology    Normal 


 


Hypochromasia    1+ 


 


Anisocytosis    1+ 


 


Erythrocyte Sedimentation Rate


  


  


  


  119 MM/HR


(0-30)


 


Reticulocyte Count


  


  


  


  0.9 %


(0.0-2.0)


 


Prothrombin Time


  


  


  


  11.3 SEC


(9.30-11.50)


 


Prothromb Time International


Ratio 


  


  


  1.1 (0.9-1.1) 


 


 


Activated Partial


Thromboplast Time 


  


  


  29 SEC (23-33) 


 


 


Sodium Level


  


  


  


  138 MMOL/L


(136-145)


 


Potassium Level


  


  


  


  4.0 MMOL/L


(3.5-5.1)


 


Chloride Level


  


  


  


  107 MMOL/L


()


 


Carbon Dioxide Level


  


  


  


  23 MMOL/L


(21-32)


 


Anion Gap


  


  


  


  8 mmol/L


(5-15)


 


Blood Urea Nitrogen


  


  


  


  16 mg/dL


(7-18)


 


Creatinine


  


  


  


  0.8 MG/DL


(0.55-1.30)


 


Estimat Glomerular Filtration


Rate 


  


  


  > 60 mL/min


(>60)


 


Glucose Level


  


  


  


  149 MG/DL


()


 


Calcium Level


  


  


  


  8.8 MG/DL


(8.5-10.1)


 


Iron Level


  


  


  


  17 ug/dL


()


 


Total Iron Binding Capacity


  


  


  


  287 ug/dL


(250-450)


 


Percent Iron Saturation    6 % (15-50) 


 


Unsaturated Iron Binding


  


  


  


  270 ug/dL


(112-346)


 


Total Bilirubin


  


  


  


  0.2 MG/DL


(0.2-1.0)


 


Aspartate Amino Transf


(AST/SGOT) 


  


  


  23 U/L (15-37) 


 


 


Alanine Aminotransferase


(ALT/SGPT) 


  


  


  18 U/L (12-78) 


 


 


Alkaline Phosphatase


  


  


  


  98 U/L


()


 


Lactate Dehydrogenase


  


  


  


  179 U/L


()


 


Troponin I


  


  


  


  0.233 ng/mL


(0.000-0.056)


 


Pro-B-Type Natriuretic Peptide


  


  


  


  2905 pg/mL


(0-125)


 


Total Protein


  


  


  


  7.4 G/DL


(6.4-8.2)


 


Albumin


  


  


  


  2.7 G/DL


(3.4-5.0)


 


Globulin    4.7 g/dL 


 


Albumin/Globulin Ratio    0.6 (1.0-2.7) 


 


Vitamin B12 Level


  


  


  


  257 PG/ML


(193-986)


 


Folate


  


  


  


  11.1 NG/ML


(3.1-17.5)


 


Test


  11/13/17


13:05 11/14/17


06:00 11/14/17


13:25 


 


 


Vancomycin Level Trough


  9.2 ug/mL


(5.0-12.0) 


  26.5 ug/mL


(5.0-12.0) 


 


 


Sodium Level


  


  139 MMOL/L


(136-145) 


  


 


 


Potassium Level


  


  3.6 MMOL/L


(3.5-5.1) 


  


 


 


Chloride Level


  


  110 MMOL/L


() 


  


 


 


Carbon Dioxide Level


  


  19 MMOL/L


(21-32) 


  


 


 


Anion Gap


  


  10 mmol/L


(5-15) 


  


 


 


Blood Urea Nitrogen


  


  15 mg/dL


(7-18) 


  


 


 


Creatinine


  


  0.6 MG/DL


(0.55-1.30) 


  


 


 


Estimat Glomerular Filtration


Rate 


  > 60 mL/min


(>60) 


  


 


 


Glucose Level


  


  139 MG/DL


() 


  


 


 


Calcium Level


  


  8.2 MG/DL


(8.5-10.1) 


  


 


 


Total Bilirubin


  


  0.3 MG/DL


(0.2-1.0) 


  


 


 


Aspartate Amino Transf


(AST/SGOT) 


  23 U/L (15-37) 


  


  


 


 


Alanine Aminotransferase


(ALT/SGPT) 


  17 U/L (12-78) 


  


  


 


 


Alkaline Phosphatase


  


  83 U/L


() 


  


 


 


Total Protein


  


  6.9 G/DL


(6.4-8.2) 


  


 


 


Albumin


  


  2.4 G/DL


(3.4-5.0) 


  


 


 


Globulin  4.5 g/dL   


 


Albumin/Globulin Ratio  0.5 (1.0-2.7)   











Risk Assessment & Plan


Assessment:


asa3


Plan:


mac


Status Change Before Surgery:  No





Pre-Antibiotics


Drug:  LINDA Rodriguez Nov 15, 2017 06:40

## 2017-11-15 NOTE — GENERAL PROGRESS NOTE
Assessment/Plan


Status:  stable


Assessment/Plan


1. Acute encephlopathy


2. Sinus Tachy cardia


3. Pleural effusion


4. Pericardial effusion


5. Endocarditis- Aortic Valve -h/o - c/w abx


6. Iatrogenic skin rashes


7. AI-Severe


4. Diabetes type 2.


5. Hypertension.


6. Hypercholesterolemia.


7. Asthma.


8. Osteoarthritis of the bilateral knees.


9. Chronic low back pain.


10. GI-DVT prophylaxia





Plan:


sever AI


discussed with Dr Crespo, CTS. Accepted patient.


ok to transfer once bed available





Subjective


ROS Limited/Unobtainable:  No


Respiratory:  Reports: shortness of breath


Allergies:  


Coded Allergies:  


     CEPHALEXIN (Verified  Allergy, Intermediate, diffuse maculopapular rash, 11 /11/17)


Uncoded Allergies:  


     Baby oil (Allergy, Severe, Itching, 11/10/17)


Subjective


persistent Diffuse maculo papular rashes all over





Objective





Last 24 Hour Vital Signs








  Date Time  Temp Pulse Resp B/P (MAP) Pulse Ox O2 Delivery O2 Flow Rate FiO2


 


11/15/17 14:00  115 26 104/37 98 Nasal Cannula 2.0 


 


11/15/17 13:00  121 28 112/43 97 Nasal Cannula 2.0 


 


11/15/17 12:00 98.0 108 27 112/36 99 Nasal Cannula 2.0 


 


11/15/17 12:00  109      


 


11/15/17 11:00  112 28 115/36 97 Nasal Cannula 2.0 


 


11/15/17 10:00  106 25 122/47 98 Nasal Cannula 2.0 


 


11/15/17 09:00  115      


 


11/15/17 09:00  108 28 140/61 99 Nasal Cannula 2.0 


 


11/15/17 08:57  104 43  97   


 


11/15/17 08:53  106 40  97   


 


11/15/17 08:45  117 30 144/60 100 Nasal Cannula 2.0 


 


11/15/17 08:40 98.6 120 29 148/71 100 Nasal Cannula 2.0 


 


11/15/17 08:40  119      


 


11/15/17 08:35  114 34 141/72 100 Nasal Cannula 2.0 


 


11/15/17 08:24  110 32 142/73 100 Nasal Cannula 2.0 


 


11/15/17 08:15  101 28 122/67 100 Nasal Cannula 2.0 


 


11/15/17 08:10  101 28 114/61 100 Nasal Cannula 2.0 


 


11/15/17 08:05  104 26 117/58 97 Nasal Cannula 2.0 


 


11/15/17 08:01 98.9 105 24 120/64 97 Nasal Cannula 2.0 


 


11/15/17 08:00  105      


 


11/15/17 07:44      Room Air  


 


11/15/17 04:51 99.7 106 18 111/56 96 Nasal Cannula 2.0 


 


11/15/17 04:00  98      


 


11/15/17 00:46 98.4 98 18 114/54 94 Room Air  


 


11/15/17 00:00  112      


 


11/14/17 20:42 99.0 99 18 110/56 95 Room Air  


 


11/14/17 20:00  107      


 


11/14/17 19:40  112 18   Room Air  21


 


11/14/17 16:00 97.8 112 21 133/59 96 Room Air  


 


11/14/17 16:00  108      

















Intake and Output  


 


 11/15/17 11/16/17





 19:00 07:00


 


Intake Total 1247.158 ml 


 


Output Total 550 ml 


 


Balance 697.158 ml 


 


  


 


Intake Oral 520 ml 


 


IV Total 727.158 ml 


 


Output Urine Total 550 ml 


 


Estimated Blood Loss 0 ml 


 


# Voids 3 








Laboratory Tests


11/15/17 07:00: 


Sodium Level 138, Potassium Level 3.5, Chloride Level 106, Carbon Dioxide Level 

21, Anion Gap 11, Blood Urea Nitrogen 16, Creatinine 0.8, Estimat Glomerular 

Filtration Rate > 60, Glucose Level 148H, Calcium Level 8.7, Total Bilirubin 0.4

, Aspartate Amino Transf (AST/SGOT) 22, Alanine Aminotransferase (ALT/SGPT) 23, 

Alkaline Phosphatase 90, Total Protein 7.3, Albumin 2.7L, Globulin 4.6, Albumin/

Globulin Ratio 0.6L


Height (Feet):  5


Height (Inches):  4.00


Weight (Pounds):  140


General Appearance:  no apparent distress


EENT:  PERRL/EOMI


Neck:  supple


Cardiovascular:  tachycardia, systolic murmur


Respiratory/Chest:  lungs clear


Abdomen:  soft


Extremities:  non-tender


Neurologic:  CNs II-XII grossly normal











Clayton Moon MD Nov 15, 2017 15:49

## 2017-11-15 NOTE — IMMEDIATE POST-OP EVALUATION
Immediate Post-Op Evalulation


Immediate Post-Op Evalulation


Procedure:  gian


Date of Evaluation:  Nov 15, 2017


Time of Evaluation:  08:13


IV Fluids:  100ml 0.9ns


Blood Products:  none


Estimated Blood Loss:  negligible


Blood Pressure Systolic:  130


Blood Pressure Diastolic:  54


Pulse Rate:  106


Respiratory Rate:  40


O2 Sat by Pulse Oximetry:  97


Temperature (Fahrenheit):  98.9


Pain Score (1-10):  0


Nausea:  No


Vomiting:  No


Complications


none


Patient Status:  awake, reacts, patent


Hydration Status:  adequate


Drug:  LINDA Rodriguez Nov 15, 2017 08:53

## 2017-11-15 NOTE — BRIEF OPERATIVE NOTE
Immediate Post Operative Note


Operative Note


Chief Complaint:  Dyspnea, tachycardia


Pre-op Diagnosis:


Aortic regurgitation


Aortic valve endocarditis


Procedure:


KITTY


Post-op Diagnosis:


Acute decompensated aortic regurgitation, pressure half time of the regurgitant 

envelope less than 120.


Very calcified and deformed aortic valve leaflets.


Sub-normal LVEF estimated at 45% c/w acute systolic CHF


Diastolic dominant pulmonary venous flow c/w acute diastolic CHF


Surgeon:  Samson medina MD


Assistant:  None


Anesthesia:  general


Specimen:  none


Complications:  none


Condition:  stable


Fluids:  None


Estimated Blood Loss:  none


Drains:  none


Implant(s) used?:  SAMSON Sunshine Nov 15, 2017 08:04

## 2017-11-15 NOTE — PULMONOLGY CRITICAL CARE NOTE
Critical Care - Asmt/Plan


Problems:  


(1) Aortic valve insufficiency


(2) Endocarditis


(3) Anemia


(4) Cardiomegaly


Respiratory:  monitor respiratory rate, adjust FIO2, CXR


Cardiac:  continue pressors, continue to monitor HR/BP


Renal:  F/U I&O, keep IV fluid


Infectious Disease:  check cultures


Gastrointestinal:  continue feedings/current rate


Endocrine:  monitor blood sugar, check TSH, check HgA1C


Neurologic:  PRN Morphine


Affect:  PRN ativan


Notes Reviewed:  internist, cardio


Discussed with:  nurses, consultants, , other - pt needs to be 

transferred to [facility] level  of care.





Critical Care - Objective





Last 24 Hour Vital Signs








  Date Time  Temp Pulse Resp B/P (MAP) Pulse Ox O2 Delivery O2 Flow Rate FiO2


 


11/15/17 08:57  104 43  97   


 


11/15/17 08:53  106 40  97   


 


11/15/17 08:45  117 30 144/60 100 Nasal Cannula 2.0 


 


11/15/17 08:40 98.6 120 29 148/71 100 Nasal Cannula 2.0 


 


11/15/17 08:40  119      


 


11/15/17 08:35  114 34 141/72 100 Nasal Cannula 2.0 


 


11/15/17 08:24  110 32 142/73 100 Nasal Cannula 2.0 


 


11/15/17 08:15  101 28 122/67 100 Nasal Cannula 2.0 


 


11/15/17 08:10  101 28 114/61 100 Nasal Cannula 2.0 


 


11/15/17 08:05  104 26 117/58 97 Nasal Cannula 2.0 


 


11/15/17 08:01 98.9 105 24 120/64 97 Nasal Cannula 2.0 


 


11/15/17 08:00  105      


 


11/15/17 07:44      Room Air  


 


11/15/17 04:51 99.7 106 18 111/56 96 Nasal Cannula 2.0 


 


11/15/17 04:00  98      


 


11/15/17 00:46 98.4 98 18 114/54 94 Room Air  


 


11/15/17 00:00  112      


 


11/14/17 20:42 99.0 99 18 110/56 95 Room Air  


 


11/14/17 20:00  107      


 


11/14/17 19:40  112 18   Room Air  21


 


11/14/17 16:00 97.8 112 21 133/59 96 Room Air  


 


11/14/17 16:00  108      


 


11/14/17 12:00 97.5 80 20 125/65 96 Room Air  


 


11/14/17 12:00  107      








Status:  awake


Condition:  critical


HEENT:  atraumatic


Lungs:  clear, chest wall tender


Heart:  HR/BP unstable


Abdomen:  soft, active bowel sounds


Accucheck:  162





Critical Care - Subjective


ROS Limited/Unobtainable:  No


Interval Events:


pt transferred to ICU because of KITTY findings, pt has acute decompensated 

aortic insufficiency.


FI02:  21


Sputum Amount:  None


Drips:  Nipride drip


I&O:











Intake and Output  


 


 11/15/17 11/16/17





 19:00 07:00


 


Intake Total 150 ml 


 


Output Total 300 ml 


 


Balance -150 ml 


 


  


 


IV Total 150 ml 


 


Output Urine Total 300 ml 


 


Estimated Blood Loss 0 ml 


 


# Voids 1 








CXR:


no change


Labs:





Laboratory Tests








Test


  11/14/17


13:25 11/15/17


07:00


 


Vancomycin Level Trough


  26.5 ug/mL


(5.0-12.0)  H 


 


 


Sodium Level


  


  138 MMOL/L


(136-145)


 


Potassium Level


  


  3.5 MMOL/L


(3.5-5.1)


 


Chloride Level


  


  106 MMOL/L


()


 


Carbon Dioxide Level


  


  21 MMOL/L


(21-32)


 


Anion Gap


  


  11 mmol/L


(5-15)


 


Blood Urea Nitrogen


  


  16 mg/dL


(7-18)


 


Creatinine


  


  0.8 MG/DL


(0.55-1.30)


 


Estimat Glomerular Filtration


Rate 


  > 60 mL/min


(>60)


 


Glucose Level


  


  148 MG/DL


()  H


 


Calcium Level


  


  8.7 MG/DL


(8.5-10.1)


 


Total Bilirubin


  


  0.4 MG/DL


(0.2-1.0)


 


Aspartate Amino Transf


(AST/SGOT) 


  22 U/L (15-37)


 


 


Alanine Aminotransferase


(ALT/SGPT) 


  23 U/L (12-78)


 


 


Alkaline Phosphatase


  


  90 U/L


()


 


Total Protein


  


  7.3 G/DL


(6.4-8.2)


 


Albumin


  


  2.7 G/DL


(3.4-5.0)  L


 


Globulin  4.6 g/dL  


 


Albumin/Globulin Ratio


  


  0.6 (1.0-2.7)


L

















AP LINDO Nov 15, 2017 11:33

## 2017-11-15 NOTE — GENERAL PROGRESS NOTE
Assessment/Plan


Status:  stable, progressing





Subjective


Date patient seen:  Nov 15, 2017


Neurologic/Psychiatric:  Reports: anxiety, depressed, emotional problems


Allergies:  


Coded Allergies:  


     CEPHALEXIN (Verified  Allergy, Intermediate, diffuse maculopapular rash, 11 /11/17)


Uncoded Allergies:  


     Baby oil (Allergy, Severe, Itching, 11/10/17)





Objective





Last 24 Hour Vital Signs








  Date Time  Temp Pulse Resp B/P (MAP) Pulse Ox O2 Delivery O2 Flow Rate FiO2


 


11/15/17 18:45  117 28 148/72 99 Nasal Cannula 2.0 


 


11/15/17 18:00  112 32 86/60 98 Nasal Cannula 2.0 


 


11/15/17 17:00  117 26 111/51 96 Nasal Cannula 2.0 


 


11/15/17 16:00  120      


 


11/15/17 16:00 98.6 117 27 93/21 98 Nasal Cannula 2.0 


 


11/15/17 15:00  119 29 108/52 98 Nasal Cannula 2.0 


 


11/15/17 14:00  115 26 104/37 98 Nasal Cannula 2.0 


 


11/15/17 13:00  121 28 112/43 97 Nasal Cannula 2.0 


 


11/15/17 12:00 98.0 108 27 112/36 99 Nasal Cannula 2.0 


 


11/15/17 12:00  109      


 


11/15/17 11:00  112 28 115/36 97 Nasal Cannula 2.0 


 


11/15/17 10:00  106 25 122/47 98 Nasal Cannula 2.0 


 


11/15/17 09:00  115      


 


11/15/17 09:00  108 28 140/61 99 Nasal Cannula 2.0 


 


11/15/17 08:57  104 43  97   


 


11/15/17 08:53  106 40  97   


 


11/15/17 08:45  117 30 144/60 100 Nasal Cannula 2.0 


 


11/15/17 08:40 98.6 120 29 148/71 100 Nasal Cannula 2.0 


 


11/15/17 08:40  119      


 


11/15/17 08:35  114 34 141/72 100 Nasal Cannula 2.0 


 


11/15/17 08:24  110 32 142/73 100 Nasal Cannula 2.0 


 


11/15/17 08:15  101 28 122/67 100 Nasal Cannula 2.0 


 


11/15/17 08:10  101 28 114/61 100 Nasal Cannula 2.0 


 


11/15/17 08:05  104 26 117/58 97 Nasal Cannula 2.0 


 


11/15/17 08:01 98.9 105 24 120/64 97 Nasal Cannula 2.0 


 


11/15/17 08:00  105      


 


11/15/17 07:44      Room Air  


 


11/15/17 04:51 99.7 106 18 111/56 96 Nasal Cannula 2.0 


 


11/15/17 04:00  98      


 


11/15/17 00:46 98.4 98 18 114/54 94 Room Air  


 


11/15/17 00:00  112      


 


11/14/17 20:42 99.0 99 18 110/56 95 Room Air  

















Intake and Output  


 


 11/15/17 11/16/17





 19:00 07:00


 


Intake Total 2152.908 ml 


 


Output Total 850 ml 


 


Balance 1302.908 ml 


 


  


 


Intake Oral 1100 ml 


 


IV Total 1052.908 ml 


 


Output Urine Total 850 ml 


 


Estimated Blood Loss 0 ml 


 


# Voids 5 








Laboratory Tests


11/15/17 07:00: 


Sodium Level 138, Potassium Level 3.5, Chloride Level 106, Carbon Dioxide Level 

21, Anion Gap 11, Blood Urea Nitrogen 16, Creatinine 0.8, Estimat Glomerular 

Filtration Rate > 60, Glucose Level 148H, Calcium Level 8.7, Total Bilirubin 0.4

, Aspartate Amino Transf (AST/SGOT) 22, Alanine Aminotransferase (ALT/SGPT) 23, 

Alkaline Phosphatase 90, Total Protein 7.3, Albumin 2.7L, Globulin 4.6, Albumin/

Globulin Ratio 0.6L


Height (Feet):  5


Height (Inches):  4.00


Weight (Pounds):  140


General Appearance:  no apparent distress, alert


Neurologic:  alert, oriented x 3, responsive, depressed affect











Laya Harrell M.D. Nov 15, 2017 20:21

## 2017-11-15 NOTE — INFECTIOUS DISEASES PROG NOTE
Assessment/Plan


Assessment/Plan








Assesment:








-Syncopal episode- r/o anatomic obstruction due to vegetations vs conduction 

abnormalities vs other





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


-Afebrile, no leukocytosis


- 2D Echo : Aortic Veg +


Elev ESR : 119





CHF:    heart failure due to severely decompensated aortic regurgitation with 

pressure half time <200.


HLD


OA


spinal stenosis


DM2


Bipolar disorder


Spondylolisthesis


Asthma  





Plan:





-Will continue her already established treatment for endocarditis with  

Doxycycline (untill 2/5/18) ,Gentamycin 60mg IV bid (untill 11/19/11), IV 

Vancomycin (until 12/12/17)


  -monitor platelets, Cr


  -typically bartonella endocarditis consist of Doxy for 6-12  weeks and Genta 

for 2 weeks; however patient has been managed by another ID physician as an 

outpatient E


monitor  Bcx


-bartonella serologies


-f/u cx


-Monitor CBC/BMP, temperatures


- KITTY report : P 


- transfer to outside facility for AVR





Subjective


Allergies:  


Coded Allergies:  


     CEPHALEXIN (Verified  Allergy, Intermediate, diffuse maculopapular rash, 11 /11/17)


Uncoded Allergies:  


     Baby oil (Allergy, Severe, Itching, 11/10/17)


Subjective





 Transferred to ICU for Nipride gtt.





Objective


Vital Signs





Last 24 Hour Vital Signs








  Date Time  Temp Pulse Resp B/P (MAP) Pulse Ox O2 Delivery O2 Flow Rate FiO2


 


11/15/17 08:57  104 43  97   


 


11/15/17 08:53  106 40  97   


 


11/15/17 08:45  117 30 144/60 100 Nasal Cannula 2.0 


 


11/15/17 08:40 98.6 120 29 148/71 100 Nasal Cannula 2.0 


 


11/15/17 08:40  119      


 


11/15/17 08:35  114 34 141/72 100 Nasal Cannula 2.0 


 


11/15/17 08:24  110 32 142/73 100 Nasal Cannula 2.0 


 


11/15/17 08:15  101 28 122/67 100 Nasal Cannula 2.0 


 


11/15/17 08:10  101 28 114/61 100 Nasal Cannula 2.0 


 


11/15/17 08:05  104 26 117/58 97 Nasal Cannula 2.0 


 


11/15/17 08:01 98.9 105 24 120/64 97 Nasal Cannula 2.0 


 


11/15/17 07:44      Room Air  


 


11/15/17 04:51 99.7 106 18 111/56 96 Nasal Cannula 2.0 


 


11/15/17 04:00  98      


 


11/15/17 00:46 98.4 98 18 114/54 94 Room Air  


 


11/15/17 00:00  112      


 


11/14/17 20:42 99.0 99 18 110/56 95 Room Air  


 


11/14/17 20:00  107      


 


11/14/17 19:40  112 18   Room Air  21


 


11/14/17 16:00 97.8 112 21 133/59 96 Room Air  


 


11/14/17 16:00  108      


 


11/14/17 12:00 97.5 80 20 125/65 96 Room Air  


 


11/14/17 12:00  107      








Height (Feet):  5


Height (Inches):  4.00


Weight (Pounds):  140


HEENT:  anicteric


Respiratory/Chest:  no respiratory distress


Cardiovascular:  regular rhythm


Abdomen:  soft, non tender





Laboratory Tests








Test


  11/14/17


13:25 11/15/17


07:00


 


Vancomycin Level Trough


  26.5 ug/mL


(5.0-12.0)  H 


 


 


Sodium Level


  


  138 MMOL/L


(136-145)


 


Potassium Level


  


  3.5 MMOL/L


(3.5-5.1)


 


Chloride Level


  


  106 MMOL/L


()


 


Carbon Dioxide Level


  


  21 MMOL/L


(21-32)


 


Anion Gap


  


  11 mmol/L


(5-15)


 


Blood Urea Nitrogen


  


  16 mg/dL


(7-18)


 


Creatinine


  


  0.8 MG/DL


(0.55-1.30)


 


Estimat Glomerular Filtration


Rate 


  > 60 mL/min


(>60)


 


Glucose Level


  


  148 MG/DL


()  H


 


Calcium Level


  


  8.7 MG/DL


(8.5-10.1)


 


Total Bilirubin


  


  0.4 MG/DL


(0.2-1.0)


 


Aspartate Amino Transf


(AST/SGOT) 


  22 U/L (15-37)


 


 


Alanine Aminotransferase


(ALT/SGPT) 


  23 U/L (12-78)


 


 


Alkaline Phosphatase


  


  90 U/L


()


 


Total Protein


  


  7.3 G/DL


(6.4-8.2)


 


Albumin


  


  2.7 G/DL


(3.4-5.0)  L


 


Globulin  4.6 g/dL  


 


Albumin/Globulin Ratio


  


  0.6 (1.0-2.7)


L











Current Medications








 Medications


  (Trade)  Dose


 Ordered  Sig/Lakshmi


 Route


 PRN Reason  Start Time


 Stop Time Status Last Admin


Dose Admin


 


 Acetaminophen


  (Tylenol)  650 mg  Q4H  PRN


 ORAL


 For Pain  11/10/17 21:00


 12/10/17 20:59   


 


 


 Acetaminophen/


 Hydrocodone Bitart


  (Norco 5/325)  1 tab  Q6H  PRN


 ORAL


 For Moderate to Severe Pain  11/10/17 21:00


 11/17/17 20:59   


 


 


 Albuterol Sulfate


  (Proventil)  2.5 mg  Q4H  PRN


 HHN


 Shortness of Breath  11/10/17 21:00


 11/15/17 20:59   


 


 


 Atorvastatin


 Calcium


  (Lipitor)  40 mg  BEDTIME


 ORAL


   11/10/17 22:30


 12/10/17 22:29  11/14/17 21:06


 


 


 Atropine Sulfate


  (Atropine)  0.5 mg  Q5M  PRN


 IV


 bpm less than 45  11/15/17 08:15


 11/15/17 14:00   


 


 


 Chlorhexidine


 Gluconate


  (Althea-Hex 2%)  1 applic  DAILY@2000


 TOPIC


   11/11/17 20:00


 12/11/17 19:59  11/14/17 19:55


 


 


 Clobetasol


 Propionate


  (Temovate)  1 applic  Q12HR


 TOPIC


   11/12/17 21:00


 12/12/17 20:59  11/14/17 21:07


 


 


 Diphenhydramine


 HCl


  (Benadryl)  25 mg  Q15M  PRN


 IVP


 Itching  11/15/17 08:15


 11/15/17 14:00   


 


 


 Diphenhydramine


 HCl


  (Benadryl)  25 mg  Q6H  PRN


 IVP


 Itching  11/10/17 22:00


 12/10/17 21:59  11/12/17 16:37


 


 


 Docusate Sodium


  (Colace)  100 mg  TWICE A  DAY


 ORAL


   11/11/17 09:00


 12/11/17 08:59  11/13/17 18:01


 


 


 Doxycycline


 Monohydrate


  (Vibramycin)  100 mg  EVERY 12  HOURS


 ORAL


   11/10/17 22:30


 11/17/17 22:29  11/14/17 21:06


 


 


 Enoxaparin Sodium


  (Lovenox)  40 mg  DAILY


 SUBQ


   11/11/17 13:00


 12/11/17 12:59  11/14/17 08:38


 


 


 Fentanyl Citrate


  (Sublimaze 100


 mcg/2 mL)  25 mcg  Q10M  PRN


 IV


 Moderate Pain (Pain Scale 4-6)  11/15/17 08:15


 11/15/17 14:00   


 


 


 Fluoxetine HCl


  (PROzac)  40 mg  DAILY


 ORAL


   11/15/17 09:00


 12/15/17 08:59   


 


 


 Gentamicin


 Protocol


  (Gentamicin


 pharmacy to dose)  1 ea  DAILY  PRN


 MISC


 Per rx protocol  11/11/17 13:00


 11/19/17 23:55   


 


 


 Gentamicin


 Sulfate 60 mg/


 Sodium Chloride  56.5 ml @ 


 110 mls/hr  Q12HR


 IVPB


   11/14/17 21:00


 11/21/17 20:59  11/14/17 21:06


 


 


 Hydralazine HCl


  (Apresoline)  5 mg  Q30M  PRN


 IV


 SBP>160 OR___/DBP>90 OR___  11/15/17 08:15


 11/15/17 14:00   


 


 


 Metformin HCl


  (Glucophage)  850 mg  BID


 ORAL


   11/11/17 09:00


 12/11/17 08:59  11/14/17 18:14


 


 


 Midazolam HCl


  (Versed 2mg/2ml


 vial)  1 mg  Q15M  PRN


 IVP


 For Anxiety  11/15/17 08:15


 11/15/17 14:00   


 


 


 Olanzapine


  (ZyPREXA)  5 mg  BEDTIME


 ORAL


   11/10/17 22:30


 12/10/17 22:29  11/14/17 21:06


 


 


 Ondansetron HCl


  (Zofran)  4 mg  Q1H  PRN


 IVP


 Nausea & Vomiting  11/15/17 08:15


 11/15/17 14:00   


 


 


 Sodium


 Nitroprusside 50


 mg/Dextrose  250 ml @ 


 5.71 mls/hr  Q24H


 IV


   11/15/17 08:15


 12/15/17 08:14   


 


 


 Sodium Chloride  1,000 ml @ 


 10 mls/hr  Q24H


 IVLG


   11/15/17 08:07


 11/15/17 10:06   


 


 


 Vancomycin HCl


  (Vanco rx to


 dose)  1 ea  DAILY  PRN


 MISC


 Per rx protocol  11/11/17 13:00


 12/11/17 12:59   


 


 


 Vancomycin HCl 1


 gm/Dextrose  275 ml @ 


 183.708


 mls/hr  Q12HR@1030,2230


 IVPB


   11/14/17 22:30


 11/19/17 22:29  11/14/17 23:01


 

















AKSHAT DELACRUZ M.D. Nov 15, 2017 09:03

## 2017-11-15 NOTE — PRE-PROCEDURE NOTE/ATTESTATION
Pre-Procedure Note/Attestation


Complete Prior to Procedure


Procedure Narrative:


KITTY





Indications for Procedure


Pre-Operative Diagnosis:


Aortic regurgitation


Aortic valve endocarditis





Attestation


I attest that I discussed the nature of the procedure; its benefits; risks and 

complications; and alternatives (and the risks and benefits of such alternatives

), prior to the procedure, with the patient (or the patient's legal 

representative).





I attest that, if there was a reasonable possibility of needing a blood 

transfusion, the patient (or the patient's legal representative) was given the 

[facility] standardized written summary, pursuant 

to the Alex [place] Blood Safety Act ([place] Health and Safety Code # 1645, as 

amended).





I attest that I re-evaluated the patient just prior to the surgery and that 

there has been no change in the patient's H&P, except as documented below:











SAMSON LAWTON Nov 15, 2017 07:31

## 2017-11-16 NOTE — DISCHARGE SUMMARY 2 SIG
DATE OF ADMISSION:  11/10/2017



DATE OF DISCHARGE:  11/15/2017



CONSULTANTS:

1. Chivo Crandall M.D.

2. Benigno Zhang M.D.

3. Dorcas Mesa M.D.

4. Laya Harrell M.D.



BRIEF HOSPITAL COURSE:  The patient is a 67-year-old white female, who

presented to ED complaining of "I passed out."  She is a resident of

[facility] and per staff was undergoing physical

therapy and had a heating pad on her back.  The patient felt dizzy and put

her head down.  The patient denied loss of consciousness.  EMS was called

and the patient was transported to [facility].  The patient

apparently was diagnosed with endocarditis in October.  She was started on

IV vancomycin and gentamicin and was transferred to [facility].  On evaluation at ED, she was started on IV fluids.

Blood work showed anemia.  EKG was in sinus tachycardia with QT

prolongation.  She had a chest x-ray done that showed no acute disease.

Due to infective endocarditis, she was admitted to telemetry for

evaluation of syncope.  She came in with a PICC line on the right arm and

was started on her established treatment with doxycycline, gentamicin, and

IV vancomycin.  CTA of the chest was negative for PE, aneurysm, or

dissection.  She continued to have tachycardia and on 11/15/2017 underwent

transesophageal echocardiogram, findings showed acute decompensated aortic

regurgitation, pressure half time of the regurgitant envelope less than

120.  There was very calcified and deformed aortic valve leaflets with

subnormal LVEF estimated at 45% consistent with acute systolic CHF.  There

was also diastolic dominant pulmonary venous flow consistent with acute

diastolic CHF.  Syncopal event was due to decreased forward flow. She was

transferred to ICU and was started on Nipride drip.  She also had

worsening of depression and was given Prozac 40 mg and Zyprexa 5 mg.  She

was eventually transferred to [facility] for possible

valve replacement.



FINAL DIAGNOSES:

1. Acute encephalopathy.

2. Sinus tachycardia.

3. Aortic valve endocarditis.

4. Pleural effusion.

5. Pericardial effusion.

6. Iatrogenic skin rash.

7. Severe aortic insufficiency.

8. Hypertension.

9. Hypercholesterolemia.

10. Asthma.

11. Osteoarthritis of bilateral knees.

12. Chronic low back pain.

13. Major depressive disorder.

14. Severe aortic valve regurgitation due to endocarditis.

15. Heart failure due to severely decompensated aortic regurgitation.

16. Diabetes mellitus, type 2.

17. Spinal stenosis.

18. Hyperlipidemia.

19. Bipolar disorder.

20. Spondylolisthesis.

21. Asthma.



DISPOSITION:  The patient was transferred to [facility].







  ______________________________________________

  Clayton Moon M.D.



I have been assigned to dictate discharge summary on this account and I

was not involved in the patient's management.



  ______________________________________________

  Jazzmine Frazier N.P.





DR:  JL/as

D:  11/16/2017 16:19

T:  11/16/2017 21:55

JOB#:  [id number]

CC:



JULIANNA

## 2017-11-16 NOTE — DISCHARGE SUMMARY
Discharge Summary


Hospital Course


Date of Admission


Nov 10, 2017 at 15:50


Date of Discharge


Nov 15, 2017 at 19:15


Admitting Diagnosis


syncope


HPI


Leyla Zaragoza is a 67 year old female who was admitted on Nov 10, 2017 at 15:50 

for Syncope


Hospital Course


[id number]





Discharge


Discharge Disposition


Patient was discharged to [facility]


Discharge Diagnoses:  











Jazzmine Frazier NP Nov 16, 2017 16:20

## 2017-11-19 NOTE — CARDIOLOGY REPORT
--------------- APPROVED REPORT --------------





EKG Measurement

Heart [id number]

TX [id number]

[id number]

[id number]

[id number]





Sinus tachycardia

Possible Left atrial enlargement

Left axis deviation

Septal infarct, age undetermined

Abnormal ECG

## 2020-08-16 ENCOUNTER — HOSPITAL ENCOUNTER (EMERGENCY)
Dept: HOSPITAL 26 - MED | Age: 70
LOS: 1 days | Discharge: HOME | End: 2020-08-17
Payer: COMMERCIAL

## 2020-08-16 VITALS — BODY MASS INDEX: 26.2 KG/M2 | WEIGHT: 163 LBS | HEIGHT: 66 IN

## 2020-08-16 VITALS — SYSTOLIC BLOOD PRESSURE: 138 MMHG | DIASTOLIC BLOOD PRESSURE: 82 MMHG

## 2020-08-16 DIAGNOSIS — Z88.1: ICD-10-CM

## 2020-08-16 DIAGNOSIS — M25.562: ICD-10-CM

## 2020-08-16 DIAGNOSIS — M25.561: ICD-10-CM

## 2020-08-16 DIAGNOSIS — F17.200: ICD-10-CM

## 2020-08-16 DIAGNOSIS — G89.29: Primary | ICD-10-CM

## 2020-08-16 DIAGNOSIS — Z95.1: ICD-10-CM

## 2020-08-16 DIAGNOSIS — J44.9: ICD-10-CM

## 2020-08-16 DIAGNOSIS — I10: ICD-10-CM

## 2020-08-16 DIAGNOSIS — E11.9: ICD-10-CM

## 2020-08-16 PROCEDURE — 96372 THER/PROPH/DIAG INJ SC/IM: CPT

## 2020-08-16 PROCEDURE — 99283 EMERGENCY DEPT VISIT LOW MDM: CPT

## 2020-08-17 VITALS — DIASTOLIC BLOOD PRESSURE: 82 MMHG | SYSTOLIC BLOOD PRESSURE: 138 MMHG

## 2020-08-17 NOTE — NUR
69 Y/O FEMALE C/O CHRONIC BILATERAL KNEE PAIN FLARE UP X 3 DAYS WITH PAIN 9/10; 
DENIES N/V/D; SKIN IS PINK/WARM/DRY; AAOX4 WITH EVEN AND STEADY GAIT; HR EVEN 
AND REGULAR; PT DENIES ANY FEVER, CP, SOB, OR COUGH AT THIS TIME;  VSS; PATIENT 
POSITIONED FOR COMFORT; HOB ELEVATED; BEDRAILS UP X2; BED DOWN AND LOCKED





PMH: ARTHRITIS/HLD/ASTHMA/DM/HTN/OPEN HEART SURGERY/SMOKES

ALLERGY: KEFLEX

## 2022-06-20 NOTE — CARDIOLOGY REPORT
--------------- APPROVED REPORT --------------





EXAM: Two-dimensional and M-mode echocardiogram with Doppler and color 

Doppler.



INDICATION

Endocarditis



M-Mode DIMENSIONS 

IVSd0.9 (0.7-1.1cm)Left Atrium (MM)5.5 (1.6-4.0cm)

LVDd5.9 (3.5-5.6cm)Aortic Root2.2 (2.0-3.7cm)

PWd1.2 (0.7-1.1cm)Aortic Cusp Exc.1.8 (1.5-2.0cm)



LVDs4.1 (2.5-4.0cm)

PWs1.3 cm





Normal left ventricular chamber size, systolic function and wall motion.

Left ventricular ejection fraction estimated to be 55-60%.

No evidence of  left ventricular hypertrophy.

Large posterior pleural effusion. Small percardial effusion.

Mild left atrial enlargement by 2D.

Right cardiac chamber sizes are within normal limits.

Focal aortic valve sclerosis with adequate cusp excursion.

Thickened mitral valve leaflets with normal excursion.

Mild mitral annulus and aortic root calcification.

Pulmonic valve not well visualized.

Normal tricuspid valve structure.

IVC is normal in size and collapsible with respiration.

Echodensity seen on aortic cusp (vegitation/calcification), consider KITTY if clinically 

indicated.



A  color flow and spectral Doppler study was performed and revealed:

Mild aortic regurgitation.

Peak aortic valve gradient of  36mmHg and a mean of 21 mmHg.

Aortic valve area  1.2 cm2 calculated by continuity equation.

Trace mitral regurgitation.

Mitral diastolic velocities suggest reduced left ventricular relaxation c/w diastolic 

dysfunction grade 1.

Trace tricuspid regurgitation.

Tricuspid  systolic velocities suggests peak right ventricular systolic pressure of  42 

mmHg

Consistent with mild pulmonary hypertension.

Pulmonic regurgitation present. Pt  gets a numb feeling on upper lip-from middle of lip to the side  It goes numb for about a few min  and then goes away  This started about two wks  ago  It doesn't happen every single day  Happens qod or q third day sometimes two times a day  She has an appt  Scheduled in August  Do you want to see her before then?